# Patient Record
Sex: FEMALE | NOT HISPANIC OR LATINO | URBAN - METROPOLITAN AREA
[De-identification: names, ages, dates, MRNs, and addresses within clinical notes are randomized per-mention and may not be internally consistent; named-entity substitution may affect disease eponyms.]

---

## 2019-03-08 ENCOUNTER — APPOINTMENT (RX ONLY)
Dept: URBAN - METROPOLITAN AREA CLINIC 149 | Facility: CLINIC | Age: 64
Setting detail: DERMATOLOGY
End: 2019-03-08

## 2019-03-08 DIAGNOSIS — L85.3 XEROSIS CUTIS: ICD-10-CM

## 2019-03-08 DIAGNOSIS — L20.89 OTHER ATOPIC DERMATITIS: ICD-10-CM

## 2019-03-08 DIAGNOSIS — Z80.8 FAMILY HISTORY OF MALIGNANT NEOPLASM OF OTHER ORGANS OR SYSTEMS: ICD-10-CM

## 2019-03-08 DIAGNOSIS — L82.1 OTHER SEBORRHEIC KERATOSIS: ICD-10-CM

## 2019-03-08 DIAGNOSIS — D22 MELANOCYTIC NEVI: ICD-10-CM

## 2019-03-08 DIAGNOSIS — L81.8 OTHER SPECIFIED DISORDERS OF PIGMENTATION: ICD-10-CM

## 2019-03-08 PROBLEM — D22.62 MELANOCYTIC NEVI OF LEFT UPPER LIMB, INCLUDING SHOULDER: Status: ACTIVE | Noted: 2019-03-08

## 2019-03-08 PROBLEM — D22.72 MELANOCYTIC NEVI OF LEFT LOWER LIMB, INCLUDING HIP: Status: ACTIVE | Noted: 2019-03-08

## 2019-03-08 PROBLEM — D22.61 MELANOCYTIC NEVI OF RIGHT UPPER LIMB, INCLUDING SHOULDER: Status: ACTIVE | Noted: 2019-03-08

## 2019-03-08 PROBLEM — E78.5 HYPERLIPIDEMIA, UNSPECIFIED: Status: ACTIVE | Noted: 2019-03-08

## 2019-03-08 PROBLEM — Z85.3 PERSONAL HISTORY OF MALIGNANT NEOPLASM OF BREAST: Status: ACTIVE | Noted: 2019-03-08

## 2019-03-08 PROBLEM — D22.5 MELANOCYTIC NEVI OF TRUNK: Status: ACTIVE | Noted: 2019-03-08

## 2019-03-08 PROBLEM — D22.71 MELANOCYTIC NEVI OF RIGHT LOWER LIMB, INCLUDING HIP: Status: ACTIVE | Noted: 2019-03-08

## 2019-03-08 PROBLEM — K21.9 GASTRO-ESOPHAGEAL REFLUX DISEASE WITHOUT ESOPHAGITIS: Status: ACTIVE | Noted: 2019-03-08

## 2019-03-08 PROCEDURE — 99203 OFFICE O/P NEW LOW 30 MIN: CPT

## 2019-03-08 PROCEDURE — ? COUNSELING

## 2019-03-08 PROCEDURE — ? PRESCRIPTION

## 2019-03-08 RX ORDER — PIMECROLIMUS 10 MG/G
CREAM TOPICAL
Qty: 60 | Refills: 2 | Status: ERX | COMMUNITY
Start: 2019-03-08

## 2019-03-08 RX ADMIN — PIMECROLIMUS: 10 CREAM TOPICAL at 00:00

## 2019-03-08 ASSESSMENT — LOCATION DETAILED DESCRIPTION DERM
LOCATION DETAILED: RIGHT PROXIMAL POSTERIOR UPPER ARM
LOCATION DETAILED: LEFT INFERIOR CENTRAL MALAR CHEEK
LOCATION DETAILED: SUBXIPHOID
LOCATION DETAILED: LEFT PROXIMAL POSTERIOR UPPER ARM
LOCATION DETAILED: MIDDLE STERNUM
LOCATION DETAILED: SUPERIOR THORACIC SPINE
LOCATION DETAILED: LEFT MEDIAL SUPERIOR CHEST
LOCATION DETAILED: RIGHT ANTERIOR DISTAL THIGH
LOCATION DETAILED: RIGHT ANTERIOR PROXIMAL THIGH
LOCATION DETAILED: RIGHT INFERIOR CENTRAL MALAR CHEEK
LOCATION DETAILED: LEFT ANTERIOR DISTAL THIGH
LOCATION DETAILED: INFERIOR MID FOREHEAD
LOCATION DETAILED: LOWER STERNUM

## 2019-03-08 ASSESSMENT — LOCATION SIMPLE DESCRIPTION DERM
LOCATION SIMPLE: UPPER BACK
LOCATION SIMPLE: CHEST
LOCATION SIMPLE: LEFT THIGH
LOCATION SIMPLE: ABDOMEN
LOCATION SIMPLE: RIGHT THIGH
LOCATION SIMPLE: RIGHT POSTERIOR UPPER ARM
LOCATION SIMPLE: LEFT POSTERIOR UPPER ARM
LOCATION SIMPLE: INFERIOR FOREHEAD
LOCATION SIMPLE: RIGHT CHEEK
LOCATION SIMPLE: LEFT CHEEK

## 2019-03-08 ASSESSMENT — LOCATION ZONE DERM
LOCATION ZONE: ARM
LOCATION ZONE: TRUNK
LOCATION ZONE: FACE
LOCATION ZONE: LEG

## 2019-03-12 ENCOUNTER — APPOINTMENT (RX ONLY)
Dept: URBAN - METROPOLITAN AREA CLINIC 149 | Facility: CLINIC | Age: 64
Setting detail: DERMATOLOGY
End: 2019-03-12

## 2019-03-12 DIAGNOSIS — L20.89 OTHER ATOPIC DERMATITIS: ICD-10-CM

## 2019-03-12 DIAGNOSIS — L259 CONTACT DERMATITIS AND OTHER ECZEMA, UNSPECIFIED CAUSE: ICD-10-CM

## 2019-03-12 PROBLEM — L23.3 ALLERGIC CONTACT DERMATITIS DUE TO DRUGS IN CONTACT WITH SKIN: Status: ACTIVE | Noted: 2019-03-12

## 2019-03-12 PROCEDURE — 99213 OFFICE O/P EST LOW 20 MIN: CPT

## 2019-03-12 PROCEDURE — ? COUNSELING

## 2019-03-12 PROCEDURE — ? PRESCRIPTION

## 2019-03-12 RX ORDER — DESONIDE 0.5 MG/G
OINTMENT TOPICAL
Qty: 1 | Refills: 1 | Status: ERX | COMMUNITY
Start: 2019-03-12

## 2019-03-12 RX ADMIN — DESONIDE: 0.5 OINTMENT TOPICAL at 00:00

## 2019-03-12 ASSESSMENT — LOCATION DETAILED DESCRIPTION DERM
LOCATION DETAILED: LEFT CENTRAL MALAR CHEEK
LOCATION DETAILED: RIGHT INFERIOR CENTRAL MALAR CHEEK
LOCATION DETAILED: RIGHT CENTRAL MALAR CHEEK
LOCATION DETAILED: LEFT INFERIOR CENTRAL MALAR CHEEK

## 2019-03-12 ASSESSMENT — LOCATION ZONE DERM: LOCATION ZONE: FACE

## 2019-03-12 ASSESSMENT — LOCATION SIMPLE DESCRIPTION DERM
LOCATION SIMPLE: LEFT CHEEK
LOCATION SIMPLE: RIGHT CHEEK

## 2020-03-06 ENCOUNTER — APPOINTMENT (RX ONLY)
Dept: URBAN - METROPOLITAN AREA CLINIC 149 | Facility: CLINIC | Age: 65
Setting detail: DERMATOLOGY
End: 2020-03-06

## 2020-03-06 DIAGNOSIS — L20.89 OTHER ATOPIC DERMATITIS: ICD-10-CM

## 2020-03-06 DIAGNOSIS — D22 MELANOCYTIC NEVI: ICD-10-CM

## 2020-03-06 DIAGNOSIS — Z80.8 FAMILY HISTORY OF MALIGNANT NEOPLASM OF OTHER ORGANS OR SYSTEMS: ICD-10-CM

## 2020-03-06 DIAGNOSIS — L57.0 ACTINIC KERATOSIS: ICD-10-CM

## 2020-03-06 DIAGNOSIS — L90.5 SCAR CONDITIONS AND FIBROSIS OF SKIN: ICD-10-CM

## 2020-03-06 DIAGNOSIS — L30.9 DERMATITIS, UNSPECIFIED: ICD-10-CM

## 2020-03-06 DIAGNOSIS — L81.8 OTHER SPECIFIED DISORDERS OF PIGMENTATION: ICD-10-CM

## 2020-03-06 DIAGNOSIS — L73.8 OTHER SPECIFIED FOLLICULAR DISORDERS: ICD-10-CM

## 2020-03-06 PROBLEM — F41.9 ANXIETY DISORDER, UNSPECIFIED: Status: ACTIVE | Noted: 2020-03-06

## 2020-03-06 PROBLEM — D22.72 MELANOCYTIC NEVI OF LEFT LOWER LIMB, INCLUDING HIP: Status: ACTIVE | Noted: 2020-03-06

## 2020-03-06 PROBLEM — D22.62 MELANOCYTIC NEVI OF LEFT UPPER LIMB, INCLUDING SHOULDER: Status: ACTIVE | Noted: 2020-03-06

## 2020-03-06 PROBLEM — D22.4 MELANOCYTIC NEVI OF SCALP AND NECK: Status: ACTIVE | Noted: 2020-03-06

## 2020-03-06 PROBLEM — D22.39 MELANOCYTIC NEVI OF OTHER PARTS OF FACE: Status: ACTIVE | Noted: 2020-03-06

## 2020-03-06 PROBLEM — F32.9 MAJOR DEPRESSIVE DISORDER, SINGLE EPISODE, UNSPECIFIED: Status: ACTIVE | Noted: 2020-03-06

## 2020-03-06 PROBLEM — L20.84 INTRINSIC (ALLERGIC) ECZEMA: Status: ACTIVE | Noted: 2020-03-06

## 2020-03-06 PROBLEM — D22.5 MELANOCYTIC NEVI OF TRUNK: Status: ACTIVE | Noted: 2020-03-06

## 2020-03-06 PROBLEM — D22.71 MELANOCYTIC NEVI OF RIGHT LOWER LIMB, INCLUDING HIP: Status: ACTIVE | Noted: 2020-03-06

## 2020-03-06 PROBLEM — D22.61 MELANOCYTIC NEVI OF RIGHT UPPER LIMB, INCLUDING SHOULDER: Status: ACTIVE | Noted: 2020-03-06

## 2020-03-06 PROCEDURE — ? PRESCRIPTION

## 2020-03-06 PROCEDURE — ? ADDITIONAL NOTES

## 2020-03-06 PROCEDURE — ? COUNSELING

## 2020-03-06 PROCEDURE — 17000 DESTRUCT PREMALG LESION: CPT

## 2020-03-06 PROCEDURE — ? LIQUID NITROGEN

## 2020-03-06 PROCEDURE — 99214 OFFICE O/P EST MOD 30 MIN: CPT | Mod: 25

## 2020-03-06 PROCEDURE — ? NOTED ON EXAM BUT NOT TREATED

## 2020-03-06 RX ORDER — DESONIDE 0.5 MG/G
OINTMENT TOPICAL
Qty: 1 | Refills: 10 | Status: ERX

## 2020-03-06 ASSESSMENT — LOCATION SIMPLE DESCRIPTION DERM
LOCATION SIMPLE: RIGHT CHEEK
LOCATION SIMPLE: RIGHT FOREARM
LOCATION SIMPLE: LEFT SHOULDER
LOCATION SIMPLE: RIGHT UPPER ARM
LOCATION SIMPLE: RIGHT THIGH
LOCATION SIMPLE: RIGHT SHOULDER
LOCATION SIMPLE: CHEST
LOCATION SIMPLE: LEFT NOSE
LOCATION SIMPLE: LEFT UPPER BACK
LOCATION SIMPLE: RIGHT ANTERIOR NECK
LOCATION SIMPLE: LEFT FOREARM
LOCATION SIMPLE: LEFT LOWER BACK
LOCATION SIMPLE: LEFT CHEEK
LOCATION SIMPLE: RIGHT LOWER BACK
LOCATION SIMPLE: LEFT UPPER ARM
LOCATION SIMPLE: RIGHT UPPER BACK
LOCATION SIMPLE: LEFT PRETIBIAL REGION
LOCATION SIMPLE: ABDOMEN
LOCATION SIMPLE: RIGHT KNEE
LOCATION SIMPLE: RIGHT PRETIBIAL REGION
LOCATION SIMPLE: LEFT POSTERIOR UPPER ARM
LOCATION SIMPLE: LEFT THIGH

## 2020-03-06 ASSESSMENT — LOCATION DETAILED DESCRIPTION DERM
LOCATION DETAILED: LEFT ANTERIOR PROXIMAL THIGH
LOCATION DETAILED: MIDDLE STERNUM
LOCATION DETAILED: RIGHT SUPERIOR LATERAL LOWER BACK
LOCATION DETAILED: LEFT PROXIMAL PRETIBIAL REGION
LOCATION DETAILED: LEFT SUPERIOR UPPER BACK
LOCATION DETAILED: LEFT PROXIMAL ULNAR DORSAL FOREARM
LOCATION DETAILED: LEFT MEDIAL SUPERIOR CHEST
LOCATION DETAILED: RIGHT PROXIMAL DORSAL FOREARM
LOCATION DETAILED: RIGHT INFERIOR CENTRAL MALAR CHEEK
LOCATION DETAILED: LEFT ANTERIOR SHOULDER
LOCATION DETAILED: RIGHT ANTERIOR SHOULDER
LOCATION DETAILED: RIGHT SUPERIOR MEDIAL UPPER BACK
LOCATION DETAILED: LEFT SUPERIOR MEDIAL UPPER BACK
LOCATION DETAILED: RIGHT KNEE
LOCATION DETAILED: SUBXIPHOID
LOCATION DETAILED: LEFT ANTERIOR DISTAL THIGH
LOCATION DETAILED: LEFT SUPERIOR LATERAL MIDBACK
LOCATION DETAILED: LEFT PROXIMAL DORSAL FOREARM
LOCATION DETAILED: RIGHT PROXIMAL PRETIBIAL REGION
LOCATION DETAILED: RIGHT INFERIOR UPPER BACK
LOCATION DETAILED: LEFT ANTERIOR DISTAL UPPER ARM
LOCATION DETAILED: LEFT PROXIMAL POSTERIOR UPPER ARM
LOCATION DETAILED: LEFT NASAL SIDEWALL
LOCATION DETAILED: RIGHT SUPERIOR LATERAL NECK
LOCATION DETAILED: RIGHT CENTRAL MALAR CHEEK
LOCATION DETAILED: RIGHT POSTERIOR SHOULDER
LOCATION DETAILED: RIGHT ANTERIOR PROXIMAL UPPER ARM
LOCATION DETAILED: LEFT CENTRAL MALAR CHEEK
LOCATION DETAILED: RIGHT LATERAL BUCCAL CHEEK
LOCATION DETAILED: RIGHT ANTERIOR DISTAL THIGH
LOCATION DETAILED: LEFT MEDIAL UPPER BACK

## 2020-03-06 ASSESSMENT — LOCATION ZONE DERM
LOCATION ZONE: LEG
LOCATION ZONE: FACE
LOCATION ZONE: NOSE
LOCATION ZONE: NECK
LOCATION ZONE: TRUNK
LOCATION ZONE: ARM

## 2020-03-06 NOTE — PROCEDURE: LIQUID NITROGEN
Consent: The patient's consent was obtained including but not limited to risks of crusting, scabbing, blistering, scarring, darker or lighter pigmentary change, recurrence, incomplete removal and infection.
Number Of Freeze-Thaw Cycles: 2 freeze-thaw cycles
Detail Level: Zone
Post-Care Instructions: I reviewed with the patient in detail post-care instructions. Patient is to wear sunprotection, and avoid picking at any of the treated lesions. Pt may apply Vaseline to crusted or scabbing areas.
Render Note In Bullet Format When Appropriate: No
Duration Of Freeze Thaw-Cycle (Seconds): 3

## 2020-03-06 NOTE — PROCEDURE: ADDITIONAL NOTES
Additional Notes: Use half betadine and half water, on the affected area in the shower, leave on for 5 minutes then rinse.
Detail Level: Simple

## 2020-08-10 ENCOUNTER — APPOINTMENT (RX ONLY)
Dept: URBAN - METROPOLITAN AREA CLINIC 4 | Facility: CLINIC | Age: 65
Setting detail: DERMATOLOGY
End: 2020-08-10

## 2020-08-10 DIAGNOSIS — L73.8 OTHER SPECIFIED FOLLICULAR DISORDERS: ICD-10-CM

## 2020-08-10 DIAGNOSIS — L81.8 OTHER SPECIFIED DISORDERS OF PIGMENTATION: ICD-10-CM

## 2020-08-10 DIAGNOSIS — L82.1 OTHER SEBORRHEIC KERATOSIS: ICD-10-CM

## 2020-08-10 DIAGNOSIS — L81.4 OTHER MELANIN HYPERPIGMENTATION: ICD-10-CM

## 2020-08-10 DIAGNOSIS — Z71.89 OTHER SPECIFIED COUNSELING: ICD-10-CM

## 2020-08-10 DIAGNOSIS — L57.0 ACTINIC KERATOSIS: ICD-10-CM

## 2020-08-10 DIAGNOSIS — D22 MELANOCYTIC NEVI: ICD-10-CM

## 2020-08-10 DIAGNOSIS — L72.0 EPIDERMAL CYST: ICD-10-CM

## 2020-08-10 DIAGNOSIS — D18.0 HEMANGIOMA: ICD-10-CM

## 2020-08-10 PROBLEM — D18.01 HEMANGIOMA OF SKIN AND SUBCUTANEOUS TISSUE: Status: ACTIVE | Noted: 2020-08-10

## 2020-08-10 PROBLEM — D23.72 OTHER BENIGN NEOPLASM OF SKIN OF LEFT LOWER LIMB, INCLUDING HIP: Status: ACTIVE | Noted: 2020-08-10

## 2020-08-10 PROBLEM — D22.72 MELANOCYTIC NEVI OF LEFT LOWER LIMB, INCLUDING HIP: Status: ACTIVE | Noted: 2020-08-10

## 2020-08-10 PROBLEM — D22.5 MELANOCYTIC NEVI OF TRUNK: Status: ACTIVE | Noted: 2020-08-10

## 2020-08-10 PROBLEM — D22.71 MELANOCYTIC NEVI OF RIGHT LOWER LIMB, INCLUDING HIP: Status: ACTIVE | Noted: 2020-08-10

## 2020-08-10 PROBLEM — D23.39 OTHER BENIGN NEOPLASM OF SKIN OF OTHER PARTS OF FACE: Status: ACTIVE | Noted: 2020-08-10

## 2020-08-10 PROBLEM — D22.61 MELANOCYTIC NEVI OF RIGHT UPPER LIMB, INCLUDING SHOULDER: Status: ACTIVE | Noted: 2020-08-10

## 2020-08-10 PROBLEM — D22.62 MELANOCYTIC NEVI OF LEFT UPPER LIMB, INCLUDING SHOULDER: Status: ACTIVE | Noted: 2020-08-10

## 2020-08-10 PROCEDURE — ? ADDITIONAL NOTES

## 2020-08-10 PROCEDURE — 17000 DESTRUCT PREMALG LESION: CPT

## 2020-08-10 PROCEDURE — 99203 OFFICE O/P NEW LOW 30 MIN: CPT | Mod: 25

## 2020-08-10 PROCEDURE — ? COUNSELING

## 2020-08-10 PROCEDURE — 17003 DESTRUCT PREMALG LES 2-14: CPT

## 2020-08-10 PROCEDURE — ? LIQUID NITROGEN

## 2020-08-10 ASSESSMENT — LOCATION DETAILED DESCRIPTION DERM
LOCATION DETAILED: RIGHT CENTRAL LATERAL NECK
LOCATION DETAILED: LEFT ANTERIOR DISTAL UPPER ARM
LOCATION DETAILED: RIGHT ANTERIOR PROXIMAL THIGH
LOCATION DETAILED: RIGHT ANTERIOR PROXIMAL UPPER ARM
LOCATION DETAILED: SUPERIOR THORACIC SPINE
LOCATION DETAILED: NASAL SUPRATIP
LOCATION DETAILED: LEFT LOWER CUTANEOUS LIP
LOCATION DETAILED: STERNAL NOTCH
LOCATION DETAILED: INFERIOR THORACIC SPINE
LOCATION DETAILED: RIGHT ANTERIOR DISTAL THIGH
LOCATION DETAILED: LEFT INFERIOR MEDIAL FOREHEAD
LOCATION DETAILED: LEFT SUPERIOR MEDIAL UPPER BACK
LOCATION DETAILED: SUBXIPHOID
LOCATION DETAILED: LEFT MEDIAL FOREHEAD
LOCATION DETAILED: LEFT ANTERIOR PROXIMAL THIGH
LOCATION DETAILED: EPIGASTRIC SKIN
LOCATION DETAILED: LOWER STERNUM
LOCATION DETAILED: LEFT ANTERIOR PROXIMAL UPPER ARM
LOCATION DETAILED: SUPERIOR MID FOREHEAD
LOCATION DETAILED: MIDDLE STERNUM
LOCATION DETAILED: LEFT ANTERIOR DISTAL THIGH
LOCATION DETAILED: LEFT MEDIAL UPPER BACK
LOCATION DETAILED: LEFT UPPER CUTANEOUS LIP
LOCATION DETAILED: RIGHT ANTERIOR DISTAL UPPER ARM

## 2020-08-10 ASSESSMENT — LOCATION SIMPLE DESCRIPTION DERM
LOCATION SIMPLE: SUPERIOR FOREHEAD
LOCATION SIMPLE: UPPER BACK
LOCATION SIMPLE: CHEST
LOCATION SIMPLE: NOSE
LOCATION SIMPLE: LEFT FOREHEAD
LOCATION SIMPLE: RIGHT THIGH
LOCATION SIMPLE: LEFT UPPER ARM
LOCATION SIMPLE: RIGHT UPPER ARM
LOCATION SIMPLE: ABDOMEN
LOCATION SIMPLE: LEFT LIP
LOCATION SIMPLE: NECK
LOCATION SIMPLE: LEFT UPPER BACK
LOCATION SIMPLE: LEFT THIGH

## 2020-08-10 ASSESSMENT — LOCATION ZONE DERM
LOCATION ZONE: NECK
LOCATION ZONE: LEG
LOCATION ZONE: LIP
LOCATION ZONE: ARM
LOCATION ZONE: FACE
LOCATION ZONE: NOSE
LOCATION ZONE: TRUNK

## 2020-08-10 NOTE — HPI: EVALUATION OF SKIN LESION(S)
What Type Of Note Output Would You Prefer (Optional)?: Standard Output
How Severe Are Your Spot(S)?: moderate
Have Your Spot(S) Been Treated In The Past?: has not been treated
Hpi Title: Evaluation of Skin Lesions
Additional History: Patient is in for a FBE.
Family Member: Step brother

## 2021-01-19 ENCOUNTER — HOSPITAL ENCOUNTER (EMERGENCY)
Dept: HOSPITAL 8 - ED | Age: 66
Discharge: HOME | End: 2021-01-19
Payer: MEDICARE

## 2021-01-19 VITALS — HEIGHT: 66 IN | WEIGHT: 182.1 LBS | BODY MASS INDEX: 29.27 KG/M2

## 2021-01-19 VITALS — DIASTOLIC BLOOD PRESSURE: 75 MMHG | SYSTOLIC BLOOD PRESSURE: 133 MMHG

## 2021-01-19 DIAGNOSIS — Z90.13: ICD-10-CM

## 2021-01-19 DIAGNOSIS — I10: ICD-10-CM

## 2021-01-19 DIAGNOSIS — R00.2: Primary | ICD-10-CM

## 2021-01-19 DIAGNOSIS — E03.9: ICD-10-CM

## 2021-01-19 DIAGNOSIS — Z85.3: ICD-10-CM

## 2021-01-19 DIAGNOSIS — E78.00: ICD-10-CM

## 2021-01-19 LAB
ALBUMIN SERPL-MCNC: 3.7 G/DL (ref 3.4–5)
ANION GAP SERPL CALC-SCNC: 8 MMOL/L (ref 5–15)
BASOPHILS # BLD AUTO: 0 X10^3/UL (ref 0–0.1)
BASOPHILS NFR BLD AUTO: 1 % (ref 0–1)
CALCIUM SERPL-MCNC: 8.7 MG/DL (ref 8.5–10.1)
CHLORIDE SERPL-SCNC: 108 MMOL/L (ref 98–107)
CREAT SERPL-MCNC: 1.09 MG/DL (ref 0.55–1.02)
EOSINOPHIL # BLD AUTO: 0.1 X10^3/UL (ref 0–0.4)
EOSINOPHIL NFR BLD AUTO: 2 % (ref 1–7)
ERYTHROCYTE [DISTWIDTH] IN BLOOD BY AUTOMATED COUNT: 12.9 % (ref 9.6–15.2)
LYMPHOCYTES # BLD AUTO: 2.6 X10^3/UL (ref 1–3.4)
LYMPHOCYTES NFR BLD AUTO: 41 % (ref 22–44)
MCH RBC QN AUTO: 33 PG (ref 27–34.8)
MCHC RBC AUTO-ENTMCNC: 33.7 G/DL (ref 32.4–35.8)
MD: NO
MONOCYTES # BLD AUTO: 0.5 X10^3/UL (ref 0.2–0.8)
MONOCYTES NFR BLD AUTO: 8 % (ref 2–9)
NEUTROPHILS # BLD AUTO: 3.1 X10^3/UL (ref 1.8–6.8)
NEUTROPHILS NFR BLD AUTO: 49 % (ref 42–75)
PLATELET # BLD AUTO: 230 X10^3/UL (ref 130–400)
PMV BLD AUTO: 8.3 FL (ref 7.4–10.4)
RBC # BLD AUTO: 3.82 X10^6/UL (ref 3.82–5.3)

## 2021-01-19 PROCEDURE — 99284 EMERGENCY DEPT VISIT MOD MDM: CPT

## 2021-01-19 PROCEDURE — 36415 COLL VENOUS BLD VENIPUNCTURE: CPT

## 2021-01-19 PROCEDURE — 84443 ASSAY THYROID STIM HORMONE: CPT

## 2021-01-19 PROCEDURE — 83735 ASSAY OF MAGNESIUM: CPT

## 2021-01-19 PROCEDURE — 93005 ELECTROCARDIOGRAM TRACING: CPT

## 2021-01-19 PROCEDURE — 82040 ASSAY OF SERUM ALBUMIN: CPT

## 2021-01-19 PROCEDURE — 80048 BASIC METABOLIC PNL TOTAL CA: CPT

## 2021-01-19 PROCEDURE — 85025 COMPLETE CBC W/AUTO DIFF WBC: CPT

## 2021-01-19 NOTE — NUR
PT COMES IN C/O PALPITATIONS X1 MO. PT STATES APPROX. 1.5 MONTHS AGO, SHE 
CHANGED FROM XANAX TO VALLIUM. PT STATES HX OF ANXIETY, HTN, HYPOTHYROID, LEFT 
BREAST CANCER WITH BILATERAL MASTECTOMY AND HIATAL HERNIA. MONITORS CONNECTED. 
EKG COMPLETE. VSS. NAD. CALL LIGHT W/IN REACH.

## 2021-01-19 NOTE — NUR
PT RESTING ON GURNEY. DENIES PAIN AT THIS TIME. VSS. NAD. AWAITING LABS. CALL 
LIGHT W/IN REACH. WILL CONTINUE TO MONITOR

## 2021-01-19 NOTE — NUR
PT AMBULATED TO DISCHARGE WITH STEADY GAIT. PT ENCOURAGED TO FOLLOWUP AS 
DISCUSSED. PT EDUCATED TO RETURN TO THE ED WITH WORSENING SYMPTOMS.

## 2021-02-25 ENCOUNTER — HOSPITAL ENCOUNTER (OUTPATIENT)
Dept: HOSPITAL 8 - CFH | Age: 66
Discharge: HOME | End: 2021-02-25
Attending: INTERNAL MEDICINE
Payer: MEDICARE

## 2021-02-25 DIAGNOSIS — R06.02: ICD-10-CM

## 2021-02-25 DIAGNOSIS — I34.0: Primary | ICD-10-CM

## 2021-02-25 DIAGNOSIS — R42: ICD-10-CM

## 2021-02-25 PROCEDURE — 93017 CV STRESS TEST TRACING ONLY: CPT

## 2021-02-25 PROCEDURE — 93306 TTE W/DOPPLER COMPLETE: CPT

## 2021-02-25 PROCEDURE — 78452 HT MUSCLE IMAGE SPECT MULT: CPT

## 2021-02-25 PROCEDURE — A9502 TC99M TETROFOSMIN: HCPCS

## 2021-03-03 DIAGNOSIS — Z23 NEED FOR VACCINATION: ICD-10-CM

## 2021-03-18 ENCOUNTER — HOSPITAL ENCOUNTER (OUTPATIENT)
Dept: HOSPITAL 8 - CFH | Age: 66
Discharge: HOME | End: 2021-03-18
Attending: INTERNAL MEDICINE
Payer: MEDICARE

## 2021-03-18 DIAGNOSIS — C50.512: Primary | ICD-10-CM

## 2021-03-18 PROCEDURE — 76642 ULTRASOUND BREAST LIMITED: CPT

## 2021-03-31 ENCOUNTER — IMMUNIZATION (OUTPATIENT)
Dept: FAMILY PLANNING/WOMEN'S HEALTH CLINIC | Facility: IMMUNIZATION CENTER | Age: 66
End: 2021-03-31
Attending: INTERNAL MEDICINE
Payer: MEDICARE

## 2021-03-31 DIAGNOSIS — Z23 NEED FOR VACCINATION: ICD-10-CM

## 2021-03-31 DIAGNOSIS — Z23 ENCOUNTER FOR VACCINATION: Primary | ICD-10-CM

## 2021-03-31 PROCEDURE — 91300 PFIZER SARS-COV-2 VACCINE: CPT | Performed by: INTERNAL MEDICINE

## 2021-03-31 PROCEDURE — 0001A PFIZER SARS-COV-2 VACCINE: CPT | Performed by: INTERNAL MEDICINE

## 2021-04-22 ENCOUNTER — IMMUNIZATION (OUTPATIENT)
Dept: FAMILY PLANNING/WOMEN'S HEALTH CLINIC | Facility: IMMUNIZATION CENTER | Age: 66
End: 2021-04-22
Payer: MEDICARE

## 2021-04-22 DIAGNOSIS — Z23 ENCOUNTER FOR VACCINATION: Primary | ICD-10-CM

## 2021-04-22 PROCEDURE — 91300 PFIZER SARS-COV-2 VACCINE: CPT

## 2021-04-22 PROCEDURE — 0002A PFIZER SARS-COV-2 VACCINE: CPT

## 2021-07-12 ENCOUNTER — HOSPITAL ENCOUNTER (EMERGENCY)
Dept: HOSPITAL 8 - ED | Age: 66
Discharge: HOME | End: 2021-07-12
Payer: MEDICARE

## 2021-07-12 VITALS — WEIGHT: 179.02 LBS | BODY MASS INDEX: 28.77 KG/M2 | HEIGHT: 66 IN

## 2021-07-12 VITALS — SYSTOLIC BLOOD PRESSURE: 148 MMHG | DIASTOLIC BLOOD PRESSURE: 83 MMHG

## 2021-07-12 DIAGNOSIS — Z85.3: ICD-10-CM

## 2021-07-12 DIAGNOSIS — E03.9: ICD-10-CM

## 2021-07-12 DIAGNOSIS — E78.00: ICD-10-CM

## 2021-07-12 DIAGNOSIS — M79.622: Primary | ICD-10-CM

## 2021-07-12 DIAGNOSIS — M25.522: ICD-10-CM

## 2021-07-12 DIAGNOSIS — I10: ICD-10-CM

## 2021-07-12 LAB
ANION GAP SERPL CALC-SCNC: 10 MMOL/L (ref 5–15)
BASOPHILS # BLD AUTO: 0.1 X10^3/UL (ref 0–0.1)
BASOPHILS NFR BLD AUTO: 1 % (ref 0–1)
CALCIUM SERPL-MCNC: 9.5 MG/DL (ref 8.5–10.1)
CHLORIDE SERPL-SCNC: 104 MMOL/L (ref 98–107)
CREAT SERPL-MCNC: 1.13 MG/DL (ref 0.55–1.02)
EOSINOPHIL # BLD AUTO: 0.1 X10^3/UL (ref 0–0.4)
EOSINOPHIL NFR BLD AUTO: 1 % (ref 1–7)
ERYTHROCYTE [DISTWIDTH] IN BLOOD BY AUTOMATED COUNT: 13.2 % (ref 9.6–15.2)
LYMPHOCYTES # BLD AUTO: 2.9 X10^3/UL (ref 1–3.4)
LYMPHOCYTES NFR BLD AUTO: 35 % (ref 22–44)
MCH RBC QN AUTO: 33 PG (ref 27–34.8)
MCHC RBC AUTO-ENTMCNC: 33.5 G/DL (ref 32.4–35.8)
MONOCYTES # BLD AUTO: 0.4 X10^3/UL (ref 0.2–0.8)
MONOCYTES NFR BLD AUTO: 4 % (ref 2–9)
NEUTROPHILS # BLD AUTO: 4.9 X10^3/UL (ref 1.8–6.8)
NEUTROPHILS NFR BLD AUTO: 59 % (ref 42–75)
PLATELET # BLD AUTO: 283 X10^3/UL (ref 130–400)
PMV BLD AUTO: 8.4 FL (ref 7.4–10.4)
RBC # BLD AUTO: 4.18 X10^6/UL (ref 3.82–5.3)

## 2021-07-12 PROCEDURE — 99284 EMERGENCY DEPT VISIT MOD MDM: CPT

## 2021-07-12 PROCEDURE — 85025 COMPLETE CBC W/AUTO DIFF WBC: CPT

## 2021-07-12 PROCEDURE — 36415 COLL VENOUS BLD VENIPUNCTURE: CPT

## 2021-07-12 PROCEDURE — 80048 BASIC METABOLIC PNL TOTAL CA: CPT

## 2021-07-12 NOTE — NUR
First contact with patient, given call bell and discussed POC/AIDET, waiting 
for ERP re-eval/dispo.

## 2021-10-18 PROBLEM — F10.10 ALCOHOL CONSUMPTION BINGE DRINKING: Status: ACTIVE | Noted: 2020-07-07

## 2021-10-18 PROBLEM — F43.10 POSTTRAUMATIC STRESS DISORDER: Status: ACTIVE | Noted: 2020-07-07

## 2021-10-18 PROBLEM — F41.1 GENERALIZED ANXIETY DISORDER: Status: ACTIVE | Noted: 2021-10-18

## 2021-10-18 PROBLEM — F33.2 SEVERE EPISODE OF RECURRENT MAJOR DEPRESSIVE DISORDER (HCC): Status: ACTIVE | Noted: 2020-07-07

## 2022-10-12 ENCOUNTER — APPOINTMENT (RX ONLY)
Dept: URBAN - METROPOLITAN AREA CLINIC 4 | Facility: CLINIC | Age: 67
Setting detail: DERMATOLOGY
End: 2022-10-12

## 2022-10-12 DIAGNOSIS — D22 MELANOCYTIC NEVI: ICD-10-CM

## 2022-10-12 DIAGNOSIS — L82.1 OTHER SEBORRHEIC KERATOSIS: ICD-10-CM

## 2022-10-12 DIAGNOSIS — Z71.89 OTHER SPECIFIED COUNSELING: ICD-10-CM

## 2022-10-12 DIAGNOSIS — L81.4 OTHER MELANIN HYPERPIGMENTATION: ICD-10-CM

## 2022-10-12 DIAGNOSIS — L70.0 ACNE VULGARIS: ICD-10-CM

## 2022-10-12 DIAGNOSIS — L81.8 OTHER SPECIFIED DISORDERS OF PIGMENTATION: ICD-10-CM

## 2022-10-12 DIAGNOSIS — L68.0 HIRSUTISM: ICD-10-CM

## 2022-10-12 DIAGNOSIS — L30.4 ERYTHEMA INTERTRIGO: ICD-10-CM

## 2022-10-12 DIAGNOSIS — L30.9 DERMATITIS, UNSPECIFIED: ICD-10-CM

## 2022-10-12 DIAGNOSIS — D18.0 HEMANGIOMA: ICD-10-CM

## 2022-10-12 PROBLEM — D18.01 HEMANGIOMA OF SKIN AND SUBCUTANEOUS TISSUE: Status: ACTIVE | Noted: 2022-10-12

## 2022-10-12 PROBLEM — D22.5 MELANOCYTIC NEVI OF TRUNK: Status: ACTIVE | Noted: 2022-10-12

## 2022-10-12 PROCEDURE — ? ADDITIONAL NOTES

## 2022-10-12 PROCEDURE — ? COUNSELING

## 2022-10-12 PROCEDURE — ? PRESCRIPTION

## 2022-10-12 PROCEDURE — 99213 OFFICE O/P EST LOW 20 MIN: CPT

## 2022-10-12 RX ORDER — HYDROCORTISONE 25 MG/G
CREAM TOPICAL BID
Qty: 30 | Refills: 0 | Status: ERX | COMMUNITY
Start: 2022-10-12

## 2022-10-12 RX ADMIN — HYDROCORTISONE: 25 CREAM TOPICAL at 00:00

## 2022-10-12 ASSESSMENT — LOCATION DETAILED DESCRIPTION DERM
LOCATION DETAILED: LEFT SUPERIOR MEDIAL UPPER BACK
LOCATION DETAILED: RIGHT SUPERIOR MEDIAL UPPER BACK
LOCATION DETAILED: LEFT LATERAL SUPERIOR CHEST
LOCATION DETAILED: EPIGASTRIC SKIN
LOCATION DETAILED: RIGHT SUPERIOR MEDIAL BUCCAL CHEEK
LOCATION DETAILED: LEFT MEDIAL SUPERIOR CHEST
LOCATION DETAILED: RIGHT INFERIOR MEDIAL UPPER BACK
LOCATION DETAILED: RIGHT MEDIAL UPPER BACK
LOCATION DETAILED: UPPER STERNUM

## 2022-10-12 ASSESSMENT — LOCATION SIMPLE DESCRIPTION DERM
LOCATION SIMPLE: RIGHT CHEEK
LOCATION SIMPLE: ABDOMEN
LOCATION SIMPLE: RIGHT UPPER BACK
LOCATION SIMPLE: LEFT UPPER BACK
LOCATION SIMPLE: CHEST

## 2022-10-12 ASSESSMENT — LOCATION ZONE DERM
LOCATION ZONE: FACE
LOCATION ZONE: TRUNK

## 2022-10-12 NOTE — PROCEDURE: COUNSELING
Detail Level: Detailed
High Dose Vitamin A Counseling: Side effects reviewed, pt to contact office should one occur.
Spironolactone Pregnancy And Lactation Text: This medication can cause feminization of the male fetus and should be avoided during pregnancy. The active metabolite is also found in breast milk.
Benzoyl Peroxide Counseling: Patient counseled that medicine may cause skin irritation and bleach clothing.  In the event of skin irritation, the patient was advised to reduce the amount of the drug applied or use it less frequently.   The patient verbalized understanding of the proper use and possible adverse effects of benzoyl peroxide.  All of the patient's questions and concerns were addressed.
Topical Clindamycin Pregnancy And Lactation Text: This medication is Pregnancy Category B and is considered safe during pregnancy. It is unknown if it is excreted in breast milk.
Topical Retinoid counseling:  Patient advised to apply a pea-sized amount only at bedtime and wait 30 minutes after washing their face before applying.  If too drying, patient may add a non-comedogenic moisturizer. The patient verbalized understanding of the proper use and possible adverse effects of retinoids.  All of the patient's questions and concerns were addressed.
Topical Sulfur Applications Pregnancy And Lactation Text: This medication is Pregnancy Category C and has an unknown safety profile during pregnancy. It is unknown if this topical medication is excreted in breast milk.
Dapsone Pregnancy And Lactation Text: This medication is Pregnancy Category C and is not considered safe during pregnancy or breast feeding.
Azithromycin Counseling:  I discussed with the patient the risks of azithromycin including but not limited to GI upset, allergic reaction, drug rash, diarrhea, and yeast infections.
Isotretinoin Counseling: Patient should get monthly blood tests, not donate blood, not drive at night if vision affected, not share medication, and not undergo elective surgery for 6 months after tx completed. Side effects reviewed, pt to contact office should one occur.
Birth Control Pills Pregnancy And Lactation Text: This medication should be avoided if pregnant and for the first 30 days post-partum.
Erythromycin Counseling:  I discussed with the patient the risks of erythromycin including but not limited to GI upset, allergic reaction, drug rash, diarrhea, increase in liver enzymes, and yeast infections.
Bactrim Pregnancy And Lactation Text: This medication is Pregnancy Category D and is known to cause fetal risk.  It is also excreted in breast milk.
Minocycline Pregnancy And Lactation Text: This medication is Pregnancy Category D and not consider safe during pregnancy. It is also excreted in breast milk.
Tazorac Pregnancy And Lactation Text: This medication is not safe during pregnancy. It is unknown if this medication is excreted in breast milk.
Azelaic Acid Counseling: Patient counseled that medicine may cause skin irritation and to avoid applying near the eyes.  In the event of skin irritation, the patient was advised to reduce the amount of the drug applied or use it less frequently.   The patient verbalized understanding of the proper use and possible adverse effects of azelaic acid.  All of the patient's questions and concerns were addressed.
Include Pregnancy/Lactation Warning?: No
Azithromycin Pregnancy And Lactation Text: This medication is considered safe during pregnancy and is also secreted in breast milk.
Topical Retinoid Pregnancy And Lactation Text: This medication is Pregnancy Category C. It is unknown if this medication is excreted in breast milk.
Aklief counseling:  Patient advised to apply a pea-sized amount only at bedtime and wait 30 minutes after washing their face before applying.  If too drying, patient may add a non-comedogenic moisturizer.  The most commonly reported side effects including irritation, redness, scaling, dryness, stinging, burning, itching, and increased risk of sunburn.  The patient verbalized understanding of the proper use and possible adverse effects of retinoids.  All of the patient's questions and concerns were addressed.
Winlevi Counseling:  I discussed with the patient the risks of topical clascoterone including but not limited to erythema, scaling, itching, and stinging. Patient voiced their understanding.
Doxycycline Counseling:  Patient counseled regarding possible photosensitivity and increased risk for sunburn.  Patient instructed to avoid sunlight, if possible.  When exposed to sunlight, patients should wear protective clothing, sunglasses, and sunscreen.  The patient was instructed to call the office immediately if the following severe adverse effects occur:  hearing changes, easy bruising/bleeding, severe headache, or vision changes.  The patient verbalized understanding of the proper use and possible adverse effects of doxycycline.  All of the patient's questions and concerns were addressed.
Topical Sulfur Applications Counseling: Topical Sulfur Counseling: Patient counseled that this medication may cause skin irritation or allergic reactions.  In the event of skin irritation, the patient was advised to reduce the amount of the drug applied or use it less frequently.   The patient verbalized understanding of the proper use and possible adverse effects of topical sulfur application.  All of the patient's questions and concerns were addressed.
Dapsone Counseling: I discussed with the patient the risks of dapsone including but not limited to hemolytic anemia, agranulocytosis, rashes, methemoglobinemia, kidney failure, peripheral neuropathy, headaches, GI upset, and liver toxicity.  Patients who start dapsone require monitoring including baseline LFTs and weekly CBCs for the first month, then every month thereafter.  The patient verbalized understanding of the proper use and possible adverse effects of dapsone.  All of the patient's questions and concerns were addressed.
Isotretinoin Pregnancy And Lactation Text: This medication is Pregnancy Category X and is considered extremely dangerous during pregnancy. It is unknown if it is excreted in breast milk.
High Dose Vitamin A Pregnancy And Lactation Text: High dose vitamin A therapy is contraindicated during pregnancy and breast feeding.
Tetracycline Counseling: Patient counseled regarding possible photosensitivity and increased risk for sunburn.  Patient instructed to avoid sunlight, if possible.  When exposed to sunlight, patients should wear protective clothing, sunglasses, and sunscreen.  The patient was instructed to call the office immediately if the following severe adverse effects occur:  hearing changes, easy bruising/bleeding, severe headache, or vision changes.  The patient verbalized understanding of the proper use and possible adverse effects of tetracycline.  All of the patient's questions and concerns were addressed. Patient understands to avoid pregnancy while on therapy due to potential birth defects.
Benzoyl Peroxide Pregnancy And Lactation Text: This medication is Pregnancy Category C. It is unknown if benzoyl peroxide is excreted in breast milk.
Spironolactone Counseling: Patient advised regarding risks of diarrhea, abdominal pain, hyperkalemia, birth defects (for female patients), liver toxicity and renal toxicity. The patient may need blood work to monitor liver and kidney function and potassium levels while on therapy. The patient verbalized understanding of the proper use and possible adverse effects of spironolactone.  All of the patient's questions and concerns were addressed.
Azelaic Acid Pregnancy And Lactation Text: This medication is considered safe during pregnancy and breast feeding.
Topical Clindamycin Counseling: Patient counseled that this medication may cause skin irritation or allergic reactions.  In the event of skin irritation, the patient was advised to reduce the amount of the drug applied or use it less frequently.   The patient verbalized understanding of the proper use and possible adverse effects of clindamycin.  All of the patient's questions and concerns were addressed.
Tazorac Counseling:  Patient advised that medication is irritating and drying.  Patient may need to apply sparingly and wash off after an hour before eventually leaving it on overnight.  The patient verbalized understanding of the proper use and possible adverse effects of tazorac.  All of the patient's questions and concerns were addressed.
Sarecycline Counseling: Patient advised regarding possible photosensitivity and discoloration of the teeth, skin, lips, tongue and gums.  Patient instructed to avoid sunlight, if possible.  When exposed to sunlight, patients should wear protective clothing, sunglasses, and sunscreen.  The patient was instructed to call the office immediately if the following severe adverse effects occur:  hearing changes, easy bruising/bleeding, severe headache, or vision changes.  The patient verbalized understanding of the proper use and possible adverse effects of sarecycline.  All of the patient's questions and concerns were addressed.
Aklief Pregnancy And Lactation Text: It is unknown if this medication is safe to use during pregnancy.  It is unknown if this medication is excreted in breast milk.  Breastfeeding women should use the topical cream on the smallest area of the skin for the shortest time needed while breastfeeding.  Do not apply to nipple and areola.
Winlevi Pregnancy And Lactation Text: This medication is considered safe during pregnancy and breastfeeding.
Bactrim Counseling:  I discussed with the patient the risks of sulfa antibiotics including but not limited to GI upset, allergic reaction, drug rash, diarrhea, dizziness, photosensitivity, and yeast infections.  Rarely, more serious reactions can occur including but not limited to aplastic anemia, agranulocytosis, methemoglobinemia, blood dyscrasias, liver or kidney failure, lung infiltrates or desquamative/blistering drug rashes.
Erythromycin Pregnancy And Lactation Text: This medication is Pregnancy Category B and is considered safe during pregnancy. It is also excreted in breast milk.
Birth Control Pills Counseling: Birth Control Pill Counseling: I discussed with the patient the potential side effects of OCPs including but not limited to increased risk of stroke, heart attack, thrombophlebitis, deep venous thrombosis, hepatic adenomas, breast changes, GI upset, headaches, and depression.  The patient verbalized understanding of the proper use and possible adverse effects of OCPs. All of the patient's questions and concerns were addressed.
Cleanser Recommendations: CeraVe acne foaming face wash on back and chin
Minocycline Counseling: Patient advised regarding possible photosensitivity and discoloration of the teeth, skin, lips, tongue and gums.  Patient instructed to avoid sunlight, if possible.  When exposed to sunlight, patients should wear protective clothing, sunglasses, and sunscreen.  The patient was instructed to call the office immediately if the following severe adverse effects occur:  hearing changes, easy bruising/bleeding, severe headache, or vision changes.  The patient verbalized understanding of the proper use and possible adverse effects of minocycline.  All of the patient's questions and concerns were addressed.
Doxycycline Pregnancy And Lactation Text: This medication is Pregnancy Category D and not consider safe during pregnancy. It is also excreted in breast milk but is considered safe for shorter treatment courses.
Topical Antifungals Recommendations: Zeasorb powder and dry well with blow dryer

## 2022-10-12 NOTE — PROCEDURE: ADDITIONAL NOTES
Additional Notes: Patient is not interested in treatment at this time.
Render Risk Assessment In Note?: no
Detail Level: Detailed
Additional Notes: Recommended patient return if this patch does not resolve for further evaluation and management.

## 2024-06-18 ENCOUNTER — APPOINTMENT (RX ONLY)
Dept: URBAN - METROPOLITAN AREA CLINIC 4 | Facility: CLINIC | Age: 69
Setting detail: DERMATOLOGY
End: 2024-06-18

## 2024-06-18 DIAGNOSIS — L81.4 OTHER MELANIN HYPERPIGMENTATION: ICD-10-CM

## 2024-06-18 DIAGNOSIS — D22 MELANOCYTIC NEVI: ICD-10-CM

## 2024-06-18 DIAGNOSIS — D18.0 HEMANGIOMA: ICD-10-CM

## 2024-06-18 DIAGNOSIS — L81.8 OTHER SPECIFIED DISORDERS OF PIGMENTATION: ICD-10-CM

## 2024-06-18 DIAGNOSIS — Z71.89 OTHER SPECIFIED COUNSELING: ICD-10-CM

## 2024-06-18 DIAGNOSIS — L85.3 XEROSIS CUTIS: ICD-10-CM

## 2024-06-18 DIAGNOSIS — L82.1 OTHER SEBORRHEIC KERATOSIS: ICD-10-CM

## 2024-06-18 DIAGNOSIS — L58.9 RADIODERMATITIS, UNSPECIFIED: ICD-10-CM

## 2024-06-18 PROBLEM — D22.5 MELANOCYTIC NEVI OF TRUNK: Status: ACTIVE | Noted: 2024-06-18

## 2024-06-18 PROBLEM — D48.5 NEOPLASM OF UNCERTAIN BEHAVIOR OF SKIN: Status: ACTIVE | Noted: 2024-06-18

## 2024-06-18 PROBLEM — D18.01 HEMANGIOMA OF SKIN AND SUBCUTANEOUS TISSUE: Status: ACTIVE | Noted: 2024-06-18

## 2024-06-18 PROCEDURE — 11102 TANGNTL BX SKIN SINGLE LES: CPT

## 2024-06-18 PROCEDURE — ? COUNSELING

## 2024-06-18 PROCEDURE — 99213 OFFICE O/P EST LOW 20 MIN: CPT | Mod: 25

## 2024-06-18 PROCEDURE — 11103 TANGNTL BX SKIN EA SEP/ADDL: CPT

## 2024-06-18 PROCEDURE — ? BIOPSY BY SHAVE METHOD

## 2024-06-18 ASSESSMENT — LOCATION SIMPLE DESCRIPTION DERM
LOCATION SIMPLE: ABDOMEN
LOCATION SIMPLE: LEFT UPPER BACK
LOCATION SIMPLE: CHEST
LOCATION SIMPLE: RIGHT UPPER BACK
LOCATION SIMPLE: LEFT SHOULDER

## 2024-06-18 ASSESSMENT — LOCATION DETAILED DESCRIPTION DERM
LOCATION DETAILED: MIDDLE STERNUM
LOCATION DETAILED: LEFT MEDIAL UPPER BACK
LOCATION DETAILED: LEFT ANTERIOR SHOULDER
LOCATION DETAILED: RIGHT MID-UPPER BACK
LOCATION DETAILED: UPPER STERNUM
LOCATION DETAILED: RIGHT SUPERIOR MEDIAL UPPER BACK
LOCATION DETAILED: RIGHT INFERIOR UPPER BACK
LOCATION DETAILED: LEFT LATERAL SUPERIOR CHEST
LOCATION DETAILED: LEFT MEDIAL SUPERIOR CHEST
LOCATION DETAILED: LEFT RIB CAGE

## 2024-06-18 ASSESSMENT — LOCATION ZONE DERM
LOCATION ZONE: TRUNK
LOCATION ZONE: ARM

## 2025-03-14 ENCOUNTER — TELEPHONE (OUTPATIENT)
Dept: INTERNAL MEDICINE | Facility: OTHER | Age: 70
End: 2025-03-14

## 2025-03-14 ENCOUNTER — OFFICE VISIT (OUTPATIENT)
Dept: INTERNAL MEDICINE | Facility: OTHER | Age: 70
End: 2025-03-14
Payer: MEDICARE

## 2025-03-14 VITALS
RESPIRATION RATE: 18 BRPM | HEART RATE: 92 BPM | HEIGHT: 66 IN | TEMPERATURE: 97 F | WEIGHT: 212.4 LBS | OXYGEN SATURATION: 80 % | DIASTOLIC BLOOD PRESSURE: 87 MMHG | SYSTOLIC BLOOD PRESSURE: 133 MMHG | BODY MASS INDEX: 34.13 KG/M2

## 2025-03-14 DIAGNOSIS — E06.3 HASHIMOTO'S THYROIDITIS: ICD-10-CM

## 2025-03-14 DIAGNOSIS — I10 BENIGN ESSENTIAL HYPERTENSION: ICD-10-CM

## 2025-03-14 DIAGNOSIS — Z13.228 SCREENING FOR METABOLIC DISORDER: ICD-10-CM

## 2025-03-14 DIAGNOSIS — E78.5 DYSLIPIDEMIA: ICD-10-CM

## 2025-03-14 DIAGNOSIS — B35.4 TINEA CORPORIS: ICD-10-CM

## 2025-03-14 DIAGNOSIS — D63.8 ANEMIA IN OTHER CHRONIC DISEASES CLASSIFIED ELSEWHERE: ICD-10-CM

## 2025-03-14 DIAGNOSIS — K25.9 GASTRIC ULCER WITHOUT HEMORRHAGE OR PERFORATION, UNSPECIFIED CHRONICITY: ICD-10-CM

## 2025-03-14 DIAGNOSIS — E55.9 VITAMIN D DEFICIENCY: ICD-10-CM

## 2025-03-14 DIAGNOSIS — E03.9 ACQUIRED HYPOTHYROIDISM: ICD-10-CM

## 2025-03-14 DIAGNOSIS — K21.9 GASTROESOPHAGEAL REFLUX DISEASE WITHOUT ESOPHAGITIS: ICD-10-CM

## 2025-03-14 PROBLEM — F51.09: Status: ACTIVE | Noted: 2020-08-19

## 2025-03-14 PROBLEM — M54.30 SCIATICA: Status: ACTIVE | Noted: 2023-07-04

## 2025-03-14 PROBLEM — H25.9 AGE-RELATED CATARACT: Status: ACTIVE | Noted: 2020-06-24

## 2025-03-14 PROBLEM — M23.50 CHRONIC INSTABILITY OF KNEE: Status: ACTIVE | Noted: 2020-06-24

## 2025-03-14 PROBLEM — M81.0 OSTEOPOROSIS: Status: ACTIVE | Noted: 2023-07-04

## 2025-03-14 PROBLEM — I49.3 PVC'S (PREMATURE VENTRICULAR CONTRACTIONS): Status: ACTIVE | Noted: 2022-08-10

## 2025-03-14 PROBLEM — G25.81 RESTLESS LEGS: Status: ACTIVE | Noted: 2020-08-19

## 2025-03-14 PROBLEM — G47.33 OSA (OBSTRUCTIVE SLEEP APNEA): Status: ACTIVE | Noted: 2022-12-28

## 2025-03-14 PROBLEM — J45.909 ASTHMA: Status: ACTIVE | Noted: 2022-08-24

## 2025-03-14 PROBLEM — J32.9 CHRONIC SINUSITIS: Status: ACTIVE | Noted: 2023-07-04

## 2025-03-14 PROBLEM — D64.9 ANEMIA: Status: ACTIVE | Noted: 2020-06-24

## 2025-03-14 PROBLEM — R26.81 UNSTEADY GAIT: Status: ACTIVE | Noted: 2022-12-28

## 2025-03-14 PROBLEM — E78.2 MIXED HYPERLIPIDEMIA: Status: ACTIVE | Noted: 2020-06-24

## 2025-03-14 PROBLEM — M19.90 ARTHRITIS: Status: ACTIVE | Noted: 2023-07-04

## 2025-03-14 PROBLEM — R25.1 TREMOR: Status: ACTIVE | Noted: 2021-02-28

## 2025-03-14 PROBLEM — Z85.3 HISTORY OF BREAST CANCER: Status: ACTIVE | Noted: 2022-06-07

## 2025-03-14 PROBLEM — J84.9 INTERSTITIAL LUNG DISEASE (HCC): Status: ACTIVE | Noted: 2023-06-01

## 2025-03-14 PROBLEM — I49.1 PREMATURE ATRIAL CONTRACTION: Status: ACTIVE | Noted: 2022-07-07

## 2025-03-14 PROBLEM — R60.0 LOWER EXTREMITY EDEMA: Status: ACTIVE | Noted: 2024-06-14

## 2025-03-14 PROBLEM — R00.2 PALPITATIONS: Status: ACTIVE | Noted: 2022-06-07

## 2025-03-14 PROBLEM — J30.9 ALLERGIC RHINITIS: Status: ACTIVE | Noted: 2020-06-24

## 2025-03-14 PROBLEM — B05.9 MEASLES: Status: ACTIVE | Noted: 2023-07-04

## 2025-03-14 PROBLEM — R74.8: Status: ACTIVE | Noted: 2023-04-25

## 2025-03-14 PROBLEM — F32.A DEPRESSIVE DISORDER: Status: ACTIVE | Noted: 2023-07-04

## 2025-03-14 PROBLEM — N18.31 STAGE 3A CHRONIC KIDNEY DISEASE: Status: ACTIVE | Noted: 2022-11-12

## 2025-03-14 PROBLEM — J42 CHRONIC BRONCHITIS (HCC): Status: ACTIVE | Noted: 2021-11-18

## 2025-03-14 PROCEDURE — 3079F DIAST BP 80-89 MM HG: CPT | Mod: GC

## 2025-03-14 PROCEDURE — 99204 OFFICE O/P NEW MOD 45 MIN: CPT | Mod: GC

## 2025-03-14 PROCEDURE — 3075F SYST BP GE 130 - 139MM HG: CPT | Mod: GC

## 2025-03-14 RX ORDER — LOSARTAN POTASSIUM 50 MG/1
1 TABLET ORAL DAILY
COMMUNITY
Start: 2025-02-04 | End: 2025-03-14 | Stop reason: SDUPTHER

## 2025-03-14 RX ORDER — CLOBETASOL PROPIONATE 0.5 MG/G
CREAM TOPICAL
COMMUNITY

## 2025-03-14 RX ORDER — DULOXETIN HYDROCHLORIDE 60 MG/1
60 CAPSULE, DELAYED RELEASE ORAL DAILY
COMMUNITY

## 2025-03-14 RX ORDER — LOSARTAN POTASSIUM 50 MG/1
50 TABLET ORAL DAILY
Qty: 30 TABLET | Refills: 2 | Status: SHIPPED | OUTPATIENT
Start: 2025-03-14 | End: 2025-03-17

## 2025-03-14 RX ORDER — LEVOTHYROXINE SODIUM 150 UG/1
150 TABLET ORAL
Qty: 30 TABLET | Refills: 1 | Status: SHIPPED | OUTPATIENT
Start: 2025-03-14

## 2025-03-14 RX ORDER — KETOCONAZOLE 20 MG/G
CREAM TOPICAL
Qty: 30 G | Refills: 1 | Status: SHIPPED | OUTPATIENT
Start: 2025-03-14

## 2025-03-14 RX ORDER — FUROSEMIDE 20 MG/1
TABLET ORAL
COMMUNITY

## 2025-03-14 RX ORDER — BUPROPION HYDROCHLORIDE 150 MG/1
150 TABLET, EXTENDED RELEASE ORAL DAILY
COMMUNITY

## 2025-03-14 RX ORDER — DIAZEPAM 5 MG/1
1 TABLET ORAL
COMMUNITY

## 2025-03-14 RX ORDER — ESOMEPRAZOLE MAGNESIUM 40 MG/1
40 CAPSULE, DELAYED RELEASE ORAL
Qty: 30 CAPSULE | Refills: 2 | Status: SHIPPED | OUTPATIENT
Start: 2025-03-14

## 2025-03-14 RX ORDER — TRIAMCINOLONE ACETONIDE 0.25 MG/ML
LOTION TOPICAL
COMMUNITY
End: 2025-03-14

## 2025-03-14 RX ORDER — SIMVASTATIN 20 MG
TABLET ORAL
COMMUNITY
Start: 2024-08-06

## 2025-03-14 RX ORDER — PHENAZOPYRIDINE HYDROCHLORIDE 100 MG/1
TABLET, FILM COATED ORAL
COMMUNITY

## 2025-03-14 RX ORDER — KETOCONAZOLE 20 MG/G
CREAM TOPICAL
COMMUNITY
End: 2025-03-14 | Stop reason: SDUPTHER

## 2025-03-14 RX ORDER — FLUTICASONE PROPIONATE 50 MCG
1 SPRAY, SUSPENSION (ML) NASAL DAILY
COMMUNITY
Start: 2024-06-14 | End: 2025-04-10

## 2025-03-14 RX ORDER — FLUTICASONE PROPIONATE 50 UG/1
POWDER, METERED RESPIRATORY (INHALATION)
COMMUNITY

## 2025-03-14 RX ORDER — ALBUTEROL SULFATE 90 UG/1
2 INHALANT RESPIRATORY (INHALATION) EVERY 6 HOURS PRN
COMMUNITY
End: 2025-03-14

## 2025-03-14 RX ORDER — VALACYCLOVIR HYDROCHLORIDE 1 G/1
TABLET, FILM COATED ORAL
COMMUNITY

## 2025-03-14 RX ORDER — BUPROPION HYDROCHLORIDE 150 MG/1
1 TABLET ORAL EVERY MORNING
COMMUNITY

## 2025-03-14 RX ORDER — LEVOTHYROXINE SODIUM 150 UG/1
150 TABLET ORAL
COMMUNITY
Start: 2025-01-09 | End: 2025-03-14 | Stop reason: SDUPTHER

## 2025-03-14 ASSESSMENT — PATIENT HEALTH QUESTIONNAIRE - PHQ9
9. THOUGHTS THAT YOU WOULD BE BETTER OFF DEAD, OR OF HURTING YOURSELF: NOT AT ALL
SUM OF ALL RESPONSES TO PHQ9 QUESTIONS 1 AND 2: 0
7. TROUBLE CONCENTRATING ON THINGS, SUCH AS READING THE NEWSPAPER OR WATCHING TELEVISION: NOT AT ALL
SUM OF ALL RESPONSES TO PHQ QUESTIONS 1-9: 0
5. POOR APPETITE OR OVEREATING: NOT AT ALL
4. FEELING TIRED OR HAVING LITTLE ENERGY: NOT AT ALL
3. TROUBLE FALLING OR STAYING ASLEEP OR SLEEPING TOO MUCH: NOT AT ALL
6. FEELING BAD ABOUT YOURSELF - OR THAT YOU ARE A FAILURE OR HAVE LET YOURSELF OR YOUR FAMILY DOWN: NOT AL ALL
2. FEELING DOWN, DEPRESSED, IRRITABLE, OR HOPELESS: NOT AT ALL
1. LITTLE INTEREST OR PLEASURE IN DOING THINGS: NOT AT ALL
8. MOVING OR SPEAKING SO SLOWLY THAT OTHER PEOPLE COULD HAVE NOTICED. OR THE OPPOSITE, BEING SO FIGETY OR RESTLESS THAT YOU HAVE BEEN MOVING AROUND A LOT MORE THAN USUAL: NOT AT ALL

## 2025-03-14 ASSESSMENT — ENCOUNTER SYMPTOMS
DEPRESSION: 0
SHORTNESS OF BREATH: 0
VOMITING: 0
CHILLS: 0
NERVOUS/ANXIOUS: 0
PALPITATIONS: 0
DIAPHORESIS: 0
INSOMNIA: 0
DIZZINESS: 0
SORE THROAT: 0
BLURRED VISION: 0
COUGH: 0
FEVER: 0
WHEEZING: 0
PHOTOPHOBIA: 0
CONSTIPATION: 0
DIARRHEA: 0
EYE PAIN: 0
HEADACHES: 0
HEARTBURN: 0
WEAKNESS: 0
NAUSEA: 0
ABDOMINAL PAIN: 0

## 2025-03-14 NOTE — ASSESSMENT & PLAN NOTE
Nexium ordered at patient's request as it is the only one that works for her. Has numerous intolerances to other PPIs.

## 2025-03-14 NOTE — PATIENT INSTRUCTIONS
Thank you for visiting [facility]!    Medications changes include refills for Losartan, Nexium, Ketoconazole, Levothyroxine.    I ordered some blood work to be done before your next appointment.     Please do not hesitate to contact me on MyChart should you need anything else!

## 2025-03-14 NOTE — ASSESSMENT & PLAN NOTE
Refilled Ketoconazole topical to be used as needed. She will continue to use talcum powder and maintaining dryness of the area to prevent recurrence of the fungal infection.

## 2025-03-14 NOTE — TELEPHONE ENCOUNTER
Patient's pharmacy contacted us and requested we change the directions and sig for the Losartan. It is mixed up, the directions don't match. Please change as soon as you can. I can also send a verbal if needed, I will be back in the clinic on Tuesday 3/18.    Thank you!

## 2025-03-14 NOTE — PROGRESS NOTES
OFFICE VISIT: INITIAL ENCOUNTER    Lidia Keen is a 69 y.o. female who presents today with the following:    Chief Complaint: Establish care, wants follow up labs    History of Present Illness:     Lidia Keen is a 68 y/o female with a past medical history of PTSD, alcohol use disorder, CONNER, depression, asthma, ILD, HTN, HLD, PUD, hypothyroidism, osteoporosis, ERIC who presents to establish care.    Patient reports she is looking for refills of her Losartan, Levothyroxine, Nexium, Ketoconazole. She is followed by a number of specialities including psychiatry and pulmonology. Patient is looking to acquire follow up labs as they were last done last year in June. No acute concerns or complaints reported today.     Review of Systems   Constitutional:  Negative for chills, diaphoresis, fever and malaise/fatigue.   HENT:  Negative for congestion, ear pain, hearing loss and sore throat.    Eyes:  Negative for blurred vision, photophobia and pain.   Respiratory:  Negative for cough, shortness of breath and wheezing.    Cardiovascular:  Negative for chest pain, palpitations and leg swelling.   Gastrointestinal:  Negative for abdominal pain, constipation, diarrhea, heartburn, nausea and vomiting.   Genitourinary:  Negative for dysuria and frequency.   Skin:  Negative for rash.   Neurological:  Negative for dizziness, weakness and headaches.   Psychiatric/Behavioral:  Negative for depression. The patient is not nervous/anxious and does not have insomnia.         Past Medical History:   Past Medical History:   Diagnosis Date    Anemia     Anxiety     Depression     Hyperlipidemia     Hypertension     Thyroid disease        Patient Active Problem List    Diagnosis Date Noted    Tinea corporis 03/14/2025    Vitamin D deficiency 06/14/2024    Lower extremity edema 06/14/2024    Arthritis 07/04/2023    Chronic sinusitis 07/04/2023    Depressive disorder 07/04/2023    Dyslipidemia 07/04/2023    Gastric ulcer  07/04/2023    Hypothyroidism 07/04/2023    Osteoporosis 07/04/2023    Sciatica 07/04/2023    Measles 07/04/2023    Interstitial lung disease (HCC) 06/01/2023    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) 04/25/2023    Unsteady gait 12/28/2022    ERIC (obstructive sleep apnea) 12/28/2022    Stage 3a chronic kidney disease 11/12/2022    Asthma 08/24/2022    PVC's (premature ventricular contractions) 08/10/2022    Premature atrial contraction 07/07/2022    History of breast cancer 06/07/2022    Palpitations 06/07/2022    Chronic bronchitis (HCC) 11/18/2021    Generalized anxiety disorder 10/18/2021    Tremor 02/28/2021    Benign essential hypertension 08/19/2020    Late insomnia 08/19/2020    Restless legs 08/19/2020    Posttraumatic stress disorder 07/07/2020    Severe episode of recurrent major depressive disorder (HCC) 07/07/2020    Alcohol consumption binge drinking 07/07/2020    Age-related cataract 06/24/2020    Allergic rhinitis 06/24/2020    Anemia 06/24/2020    Mixed hyperlipidemia 06/24/2020    Gastroesophageal reflux disease 06/24/2020    Hashimoto's thyroiditis 06/24/2020    Chronic instability of knee 06/24/2020       Past Surgical History:   Procedure Laterality Date    ARTHROSCOPIC TIBIAL PLATEAU FRACTURE REPAIR Right     BUNIONECTOMY Right     CERVICAL DISK AND FUSION ANTERIOR      C4-7, d/t herniated disc    ENDOMETRIAL ABLATION N/A     MASTECTOMY BILATERAL SUBQ Bilateral         Allergies:  Bacitracin, Doxycycline, Albuterol, Anastrozole, Aripiprazole, Brompheniramine, Buspirone, Cariprazine, Cephalexin, Clavulanic acid, Clonazepam, Cyclobenzaprine, Desonide, Dexlansoprazole, Diphenhydramine, Ephedrine, Escitalopram, Exemestane, Famotidine, Fexofenadine, Fluoxetine, Gabapentin, Ipratropium, Lamotrigine, Lithium, Lurasidone, Moxifloxacin, Mupirocin, Olanzapine, Oxycodone, Pimecrolimus, Pregabalin, Propranolol, Quetiapine, Rabeprazole, Risperidone, Ropinirole, Tiotropium  "bromide monohydrate, Tizanidine, Tolterodine, Topiramate, Tramadol, Trazodone, Valproic acid, Venlafaxine, Vilazodone, Ciprofloxacin, Clindamycin, and Metronidazole    Medications:     Current Outpatient Medications:     buPROPion SR, 150 mg, Oral, DAILY, Taking    Clobetasol Prop Emollient Base, Apply  topically., PRN    clobetasol, APPLY A THIN LAYER TO THE AFFECTED AREA(S) BY TOPICAL ROUTE 2 TIMES PER DAY, PRN    diazePAM, 1 Tablet, Oral, QDAY PRN, PRN    fluticasone, 1 Spray, Nasal, DAILY, Taking    Fluticasone Propionate Diskus, Inhale., Taking    furosemide, TAKE 1 TABLET BY MOUTH ONCE DAILY AS NEEDED FOR WEIGHT GAIN >2LBS, PRN    phenazopyridine, TAKE 1 TABLET BY MOUTH 3 TIMES A DAY FOR 2 DAYS AS NEEDED FOR DYSURIA, PRN    simvastatin, , Taking    valacyclovir, Take 1 tablet every 12 hours by oral route., PRN    DULoxetine, 60 mg, Oral, DAILY, Taking    levothyroxine, 150 mcg, Oral, AM ES, Taking    losartan, 50 mg, Oral, DAILY, Taking    ketoconazole, APPLY TO THE AFFECTED AREA(S) BY TOPICAL ROUTE ONCE DAILY, Taking    esomeprazole, 40 mg, Oral, QAM AC, Taking    DULoxetine, 120 mg, Oral, DAILY, Taking    buPROPion, 1 Tablet, Oral, QAM     Social History:   Social History     Tobacco Use    Smoking status: Never    Smokeless tobacco: Never   Vaping Use    Vaping status: Never Used   Substance Use Topics    Alcohol use: Yes     Alcohol/week: 1.8 oz     Types: 3 Shots of liquor per week     Comment: once a week    Drug use: Not Currently     Types: Marijuana       Family History:   Family History   Problem Relation Age of Onset    Alcohol abuse Mother     Prostate cancer Father     Pancreatic Cancer Father     Lupus Sister     Heart Attack Maternal Grandfather        Vitals:   /87   Pulse 92   Temp 36.1 °C (97 °F) (Temporal)   Resp 18   Ht 1.676 m (5' 6\")   Wt 96.3 kg (212 lb 6.4 oz)   SpO2 (!) 80%  Body mass index is 34.28 kg/m².    Physical Exam  Vitals and nursing note reviewed. "   Constitutional:       General: She is not in acute distress.     Appearance: She is not diaphoretic.      Comments: On 2L of supplemental O2   HENT:      Head: Normocephalic and atraumatic.      Mouth/Throat:      Mouth: Mucous membranes are moist.      Pharynx: Oropharynx is clear. No oropharyngeal exudate or posterior oropharyngeal erythema.   Cardiovascular:      Rate and Rhythm: Normal rate and regular rhythm.      Pulses: Normal pulses.      Heart sounds: Normal heart sounds. No murmur heard.     No gallop.   Pulmonary:      Effort: Pulmonary effort is normal. No respiratory distress.      Breath sounds: Normal breath sounds. No wheezing, rhonchi or rales.   Abdominal:      General: There is no distension.      Palpations: Abdomen is soft.      Tenderness: There is no abdominal tenderness.   Musculoskeletal:      Right lower leg: No edema.      Left lower leg: No edema.   Skin:     General: Skin is warm.      Findings: No rash.   Neurological:      General: No focal deficit present.      Mental Status: She is alert and oriented to person, place, and time.   Psychiatric:         Mood and Affect: Mood normal.          Labs:   N/A  Imaging:   N/A  Assessment and Plan    Anemia  CBC ordered to monitor. Did not have evidence of anemia based on her prior CBC.    Benign essential hypertension  BP well controlled on Losartan. Refills provided today.    Dyslipidemia  Currently on Simvastatin. Will recheck lipid panel for monitoring.    Gastric ulcer  Nexium ordered at patient's request as it is the only one that works for her. Has numerous intolerances to other PPIs.    Vitamin D deficiency  Previously found to be low. Not currently on supplements. Will recheck level before recommending supplementation.    Hypothyroidism  Last TSH was WNL. Refilled Levothyroxine today. Will recheck TSH for monitoring.    Tinea corporis  Refilled Ketoconazole topical to be used as needed. She will continue to use talcum powder and  maintaining dryness of the area to prevent recurrence of the fungal infection.       Orders Placed This Encounter    VITAMIN D,25 HYDROXY (DEFICIENCY)    Comp Metabolic Panel    Lipid Profile    TSH WITH REFLEX TO FT4    CBC WITHOUT DIFFERENTIAL    HEMOGLOBIN A1C    DISCONTD: albuterol (VENTOLIN HFA) 108 (90 Base) MCG/ACT Aero Soln inhalation aerosol    buPROPion (WELLBUTRIN XL) 150 MG XL tablet    buPROPion SR (WELLBUTRIN-SR) 150 MG TABLET SR 12 HR sustained-release tablet    Clobetasol Prop Emollient Base 0.05 % Cream    clobetasol (TEMOVATE) 0.05 % Cream    diazePAM (VALIUM) 5 MG Tab    fluticasone (FLONASE) 50 MCG/ACT nasal spray    Fluticasone Propionate Diskus 50 MCG/ACT AEROSOL POWDER, BREATH ACTIVATED    furosemide (LASIX) 20 MG Tab    DISCONTD: ketoconazole (NIZORAL) 2 % Cream    DISCONTD: losartan (COZAAR) 50 MG Tab    phenazopyridine (PYRIDIUM) 100 MG Tab    simvastatin (ZOCOR) 20 MG Tab    DISCONTD: Triamcinolone Acetonide 0.025 % Lotion    valacyclovir (VALTREX) 1 GM Tab    DULoxetine (CYMBALTA) 60 MG Cap DR Particles delayed-release capsule    DISCONTD: levothyroxine (SYNTHROID) 150 MCG Tab    DISCONTD: Esomeprazole Magnesium (NEXIUM 24HR PO)    levothyroxine (SYNTHROID) 150 MCG Tab    losartan (COZAAR) 50 MG Tab    ketoconazole (NIZORAL) 2 % Cream    esomeprazole (NEXIUM) 40 MG delayed-release capsule       Return in about 5 weeks (around 4/18/2025) for w/ PCP.      Please note that this dictation was created using voice recognition software. I have made every reasonable attempt to correct obvious errors, but I expect that there are errors of grammar and possibly content that I did not discover before finalizing the note.    Patient case was seen/ assessed/ discussed with Dr. Waldrop    Signed by:    Fredy Ambrosio DO    PGY-1 Internal Medicine Resident

## 2025-03-14 NOTE — LETTER
[facility]  Fredy Ambrosio D.O.  [address]  [place] [address]  Fax: [phone]   Authorization for Release/Disclosure of   Protected Health Information   Name: LIDIA VAIL : 1955 SSN: [id number]   Address: [address]  [place] [address] Phone:    [phone] (home)    I authorize the entity listed below to release/disclose the PHI below to:   [facility]/Fredy Ambrosio D.O. and Fredy Ambrosio D.O.   Provider or Entity Name:     Address   City, State, Zip   Phone:      Fax:     Reason for request: continuity of care   Information to be released:    [  ] LAST COLONOSCOPY,  including any PATH REPORT and follow-up  [  ] LAST FIT/COLOGUARD RESULT [  ] LAST DEXA  [  ] LAST MAMMOGRAM  [  ] LAST PAP  [  ] LAST LABS [  ] RETINA EXAM REPORT  [  ] IMMUNIZATION RECORDS  [  ] Release all info      [  ] Check here and initial the line next to each item to release ALL health information INCLUDING  _____ Care and treatment for drug and / or alcohol abuse  _____ HIV testing, infection status, or AIDS  _____ Genetic Testing    DATES OF SERVICE OR TIME PERIOD TO BE DISCLOSED: _____________  I understand and acknowledge that:  * This Authorization may be revoked at any time by you in writing, except if your health information has already been used or disclosed.  * Your health information that will be used or disclosed as a result of you signing this authorization could be re-disclosed by the recipient. If this occurs, your re-disclosed health information may no longer be protected by State or Federal laws.  * You may refuse to sign this Authorization. Your refusal will not affect your ability to obtain treatment.  * This Authorization becomes effective upon signing and will  on (date) __________.      If no date is indicated, this Authorization will  one (1) year from the signature date.    Name: Lidia Vail  Signature: Date:   3/14/2025     PLEASE FAX REQUESTED RECORDS BACK TO: [phone]  [phone]

## 2025-03-14 NOTE — ASSESSMENT & PLAN NOTE
Previously found to be low. Not currently on supplements. Will recheck level before recommending supplementation.

## 2025-03-17 RX ORDER — LOSARTAN POTASSIUM 50 MG/1
TABLET ORAL
Qty: 30 TABLET | Refills: 2 | Status: SHIPPED | OUTPATIENT
Start: 2025-03-17

## 2025-03-18 ENCOUNTER — TELEPHONE (OUTPATIENT)
Dept: INTERNAL MEDICINE | Facility: OTHER | Age: 70
End: 2025-03-18
Payer: MEDICARE

## 2025-03-18 NOTE — TELEPHONE ENCOUNTER
Release of information  paperwork received from appointment requiring provider signature.      All appropriate fields completed by Medical Assistant: Yes    Paperwork handed to provider to sign then faxed to [phone] & [phone]

## 2025-04-16 DIAGNOSIS — E55.9 VITAMIN D DEFICIENCY: ICD-10-CM

## 2025-04-16 DIAGNOSIS — D63.8 ANEMIA IN OTHER CHRONIC DISEASES CLASSIFIED ELSEWHERE: ICD-10-CM

## 2025-04-16 DIAGNOSIS — I10 BENIGN ESSENTIAL HYPERTENSION: ICD-10-CM

## 2025-04-16 DIAGNOSIS — E78.5 DYSLIPIDEMIA: ICD-10-CM

## 2025-04-16 DIAGNOSIS — E06.3 HASHIMOTO'S THYROIDITIS: ICD-10-CM

## 2025-04-16 DIAGNOSIS — E03.9 ACQUIRED HYPOTHYROIDISM: ICD-10-CM

## 2025-04-16 DIAGNOSIS — Z13.228 SCREENING FOR METABOLIC DISORDER: ICD-10-CM

## 2025-04-22 ENCOUNTER — OFFICE VISIT (OUTPATIENT)
Dept: INTERNAL MEDICINE | Facility: OTHER | Age: 70
End: 2025-04-22
Payer: MEDICARE

## 2025-04-22 VITALS
WEIGHT: 205.2 LBS | TEMPERATURE: 97.1 F | BODY MASS INDEX: 32.98 KG/M2 | HEIGHT: 66 IN | OXYGEN SATURATION: 99 % | DIASTOLIC BLOOD PRESSURE: 79 MMHG | SYSTOLIC BLOOD PRESSURE: 112 MMHG | HEART RATE: 86 BPM

## 2025-04-22 DIAGNOSIS — M81.0 AGE-RELATED OSTEOPOROSIS WITHOUT CURRENT PATHOLOGICAL FRACTURE: ICD-10-CM

## 2025-04-22 DIAGNOSIS — M54.42 CHRONIC BILATERAL LOW BACK PAIN WITH LEFT-SIDED SCIATICA: ICD-10-CM

## 2025-04-22 DIAGNOSIS — I10 BENIGN ESSENTIAL HYPERTENSION: ICD-10-CM

## 2025-04-22 DIAGNOSIS — M48.04 THORACIC SPINAL STENOSIS: ICD-10-CM

## 2025-04-22 DIAGNOSIS — E78.2 MIXED HYPERLIPIDEMIA: ICD-10-CM

## 2025-04-22 DIAGNOSIS — E03.4 HYPOTHYROIDISM DUE TO ACQUIRED ATROPHY OF THYROID: ICD-10-CM

## 2025-04-22 DIAGNOSIS — M50.20 CERVICAL HERNIATED DISC: ICD-10-CM

## 2025-04-22 DIAGNOSIS — Z00.00 HEALTHCARE MAINTENANCE: ICD-10-CM

## 2025-04-22 DIAGNOSIS — N18.31 STAGE 3A CHRONIC KIDNEY DISEASE: ICD-10-CM

## 2025-04-22 DIAGNOSIS — I89.0 LYMPHEDEMA: ICD-10-CM

## 2025-04-22 DIAGNOSIS — Z12.11 COLON CANCER SCREENING: ICD-10-CM

## 2025-04-22 DIAGNOSIS — Z13.820 OSTEOPOROSIS SCREENING: ICD-10-CM

## 2025-04-22 DIAGNOSIS — F33.2 SEVERE EPISODE OF RECURRENT MAJOR DEPRESSIVE DISORDER, WITHOUT PSYCHOTIC FEATURES (HCC): ICD-10-CM

## 2025-04-22 DIAGNOSIS — E78.5 DYSLIPIDEMIA: ICD-10-CM

## 2025-04-22 DIAGNOSIS — G89.29 CHRONIC BILATERAL LOW BACK PAIN WITH LEFT-SIDED SCIATICA: ICD-10-CM

## 2025-04-22 DIAGNOSIS — J84.9 INTERSTITIAL LUNG DISEASE (HCC): ICD-10-CM

## 2025-04-22 PROBLEM — K25.9 GASTRIC ULCER: Status: RESOLVED | Noted: 2023-07-04 | Resolved: 2025-04-22

## 2025-04-22 PROBLEM — F32.A DEPRESSIVE DISORDER: Status: RESOLVED | Noted: 2023-07-04 | Resolved: 2025-04-22

## 2025-04-22 PROCEDURE — 3078F DIAST BP <80 MM HG: CPT

## 2025-04-22 PROCEDURE — 3074F SYST BP LT 130 MM HG: CPT

## 2025-04-22 PROCEDURE — 99214 OFFICE O/P EST MOD 30 MIN: CPT | Mod: GC

## 2025-04-22 RX ORDER — LOSARTAN POTASSIUM 50 MG/1
TABLET ORAL
Qty: 45 TABLET | Refills: 2 | Status: SHIPPED | OUTPATIENT
Start: 2025-04-22

## 2025-04-22 RX ORDER — SIMVASTATIN 40 MG
40 TABLET ORAL NIGHTLY
Qty: 100 TABLET | Refills: 3 | Status: SHIPPED | OUTPATIENT
Start: 2025-04-22 | End: 2026-05-27

## 2025-04-22 ASSESSMENT — ENCOUNTER SYMPTOMS
HEARTBURN: 0
NERVOUS/ANXIOUS: 0
WEAKNESS: 0
SORE THROAT: 0
FEVER: 0
NAUSEA: 0
INSOMNIA: 0
COUGH: 0
VOMITING: 0
ABDOMINAL PAIN: 0
WHEEZING: 0
HEADACHES: 0
CONSTIPATION: 0
PALPITATIONS: 0
DIZZINESS: 0
PHOTOPHOBIA: 0
CHILLS: 0
BLURRED VISION: 0
EYE PAIN: 0
DEPRESSION: 0
DIAPHORESIS: 0
SHORTNESS OF BREATH: 1
DIARRHEA: 0

## 2025-04-22 NOTE — PROGRESS NOTES
"    OFFICE VISIT    Lidia Keen is a 69 y.o. female who presents today with the following:    Reason for visit: Follow up regarding lab results    HPI:    Patient reports that she continues to have trouble breathing and feeling overly fatigued throughout the day. She does not use the Fluticasone nasal spray everyday as it worsens her leg edema and can cause thrush. Options are quite limited due to her allergy to all THOMAS, LABA products. She plans to follow up with her pulm doctor soon.    She asks if the clinic can off trigger point injections to her back as that has been helpful in the past. Patient has a history of multiple surgeries on her cervical and thoracic spine for herniated discs and spinal stenosis.     Review of Systems   Constitutional:  Positive for malaise/fatigue. Negative for chills, diaphoresis and fever.   HENT:  Negative for congestion, ear pain, hearing loss and sore throat.    Eyes:  Negative for blurred vision, photophobia and pain.   Respiratory:  Positive for shortness of breath. Negative for cough and wheezing.    Cardiovascular:  Positive for leg swelling (b/l). Negative for chest pain and palpitations.   Gastrointestinal:  Negative for abdominal pain, constipation, diarrhea, heartburn, nausea and vomiting.   Genitourinary:  Negative for dysuria and frequency.   Skin:  Negative for rash.   Neurological:  Negative for dizziness, weakness and headaches.   Psychiatric/Behavioral:  Negative for depression. The patient is not nervous/anxious and does not have insomnia.        Vitals:   /79 (BP Location: Right arm, Patient Position: Sitting, BP Cuff Size: Large adult)   Pulse 86   Temp 36.2 °C (97.1 °F) (Temporal)   Ht 1.676 m (5' 6\")   Wt 93.1 kg (205 lb 3.2 oz)   SpO2 99%   BMI 33.12 kg/m²     Physical Exam  Vitals and nursing note reviewed.   Constitutional:       General: She is not in acute distress.     Appearance: She is not diaphoretic.   HENT:      Head: Normocephalic " and atraumatic.      Mouth/Throat:      Mouth: Mucous membranes are moist.      Pharynx: Oropharynx is clear. No oropharyngeal exudate or posterior oropharyngeal erythema.   Cardiovascular:      Rate and Rhythm: Normal rate and regular rhythm.      Pulses: Normal pulses.      Heart sounds: Normal heart sounds. No murmur heard.     No gallop.   Pulmonary:      Effort: Pulmonary effort is normal. No respiratory distress.      Breath sounds: Normal breath sounds. No wheezing, rhonchi or rales.   Abdominal:      General: There is no distension.      Palpations: Abdomen is soft.      Tenderness: There is no abdominal tenderness.   Musculoskeletal:      Right lower leg: Edema (nonpitting) present.      Left lower leg: Edema (nonpitting) present.   Skin:     General: Skin is warm.      Findings: No rash.   Neurological:      General: No focal deficit present.      Mental Status: She is alert and oriented to person, place, and time.   Psychiatric:         Mood and Affect: Mood normal.         Assessment and Plan:     Benign essential hypertension  BP well controlled on Losartan. Adjusted med refill for 45 tabs a month.    Dyslipidemia  Reviewed lipid panel results which showed elevated LDL at 139 and total cholesterol 236. ASCVD risk score was 11.7%.  -Will increase Simvastatin to 40 mg QHS  -Will recheck lipid panel in 6 months    Stage 3a chronic kidney disease  GFR was 59 which is stable from prior. Will continue to monitor. Continue Losartan as prescribed.    Interstitial lung disease (HCC)  Patient to follow up with pulmonology to discuss further medical management and renewing oxygen.  -Will refer to pulm rehab    Lymphedema  Will refer patient to lymphedema clinic for compression stockings and management of chronic issue.   Recommended elevating legs when able.  Medical therapy is not typically recommended to lymphedema.    Osteoporosis  Currently on no medications. Unsure of history. Will recheck with DEXA  scan.    Thoracic spinal stenosis  Hx of multiple spinal surgeries on her cervical and thoracic spine. Will refer to pain management for trigger point injections.     Hypothyroidism  Recent TSH was WNL. Will continue current dose of Levothyroxine.    Severe episode of recurrent major depressive disorder (HCC)  Followed closely with psychiatry who manages Valium, Cymbalta, Wellbutrin. Will defer any further changes to psychiatry.    Healthcare maintenance  Cologuard ordered for colon cancer screening.      Orders Placed This Encounter    DS-BONE DENSITY STUDY (DEXA)    Cologuard® colon cancer screening    Referral to Pulmonary Rehab    Referral to Pain Management    REFERRAL TO LYMPhEDEMA-PHYSICAL THERAPY    simvastatin (ZOCOR) 40 MG Tab    losartan (COZAAR) 50 MG Tab       Return in about 6 weeks (around 6/3/2025) for w/ PCP for annual wellness.      Patient case was seen/ assessed/ discussed with Dr. Osborn      Please note that this dictation was created using voice recognition software. I have made every reasonable attempt to correct obvious errors, but I expect that there are errors of grammar and possibly content that I did not discover before finalizing the note.       Signed by:    Fredy Ambrosio DO    PGY-1 Internal Medicine Resident

## 2025-04-23 NOTE — ASSESSMENT & PLAN NOTE
Patient to follow up with pulmonology to discuss further medical management and renewing oxygen.  -Will refer to pulm rehab

## 2025-04-23 NOTE — ASSESSMENT & PLAN NOTE
Followed closely with psychiatry who manages Valium, Cymbalta, Wellbutrin. Will defer any further changes to psychiatry.

## 2025-04-23 NOTE — ASSESSMENT & PLAN NOTE
Reviewed lipid panel results which showed elevated LDL at 139 and total cholesterol 236. ASCVD risk score was 11.7%.  -Will increase Simvastatin to 40 mg QHS  -Will recheck lipid panel in 6 months

## 2025-04-23 NOTE — ASSESSMENT & PLAN NOTE
Will refer patient to lymphedema clinic for compression stockings and management of chronic issue.   Recommended elevating legs when able.  Medical therapy is not typically recommended to lymphedema.

## 2025-04-23 NOTE — PATIENT INSTRUCTIONS
Thank you for visiting [facility]!    Medications changes include increasing Simvastatin to 40 mg once daily, refilling Losartan for 45 tabs a month.     I made a referral to pulmonary rehab and the lymphedema clinic.    Cologuard, DEXA scan were ordered.    Follow up with your pulmonologist and psychiatrist for further adjustments to your medications.     Please do not hesitate to contact me on MyChart should you need anything else!

## 2025-04-23 NOTE — ASSESSMENT & PLAN NOTE
Hx of multiple spinal surgeries on her cervical and thoracic spine. Will refer to pain management for trigger point injections.

## 2025-04-30 NOTE — Clinical Note
REFERRAL APPROVAL NOTICE         Sent on April 30, 2025                   Lidia Keen  [address]  [place] [place] [address]                   Dear Ms. Keen,    After a careful review of the medical information and benefit coverage, [facility] has processed your referral. See below for additional details.    If applicable, you must be actively enrolled with your insurance for coverage of the authorized service. If you have any questions regarding your coverage, please contact your insurance directly.    REFERRAL INFORMATION   Referral #:  [id number]  Referred-To Department    Referred-By Provider:  Physical Therapy    Fredy Ambrosio D.O.   [facility] [address]      [address]  [place] [address]  [phone] [address]  [address]  [place] [place] [place] [id number] [phone]    Referral Start Date:  04/22/2025  Referral End Date:   04/22/2026             SCHEDULING  If you do not already have an appointment, please call [phone] to make an appointment.     MORE INFORMATION  If you do not already have a [employer] account, sign up at: [email or web address]  You can access your medical information, make appointments, see lab results, billing information, and more.  If you have questions regarding this referral, please contact  the [facility] Referrals department at:             [phone]. Monday - Friday 8:00AM - 5:00PM.     Sincerely,    [facility]

## 2025-05-02 ENCOUNTER — APPOINTMENT (OUTPATIENT)
Dept: PHYSICAL MEDICINE AND REHAB | Facility: MEDICAL CENTER | Age: 70
End: 2025-05-02
Payer: MEDICARE

## 2025-05-07 NOTE — Clinical Note
REFERRAL APPROVAL NOTICE         Sent on May 7, 2025                   Lidia Keen  [address]  [place] [address]                   Dear Ms. Keen,    After a careful review of the medical information and benefit coverage, [facility] has processed your referral. See below for additional details.    If applicable, you must be actively enrolled with your insurance for coverage of the authorized service. If you have any questions regarding your coverage, please contact your insurance directly.    REFERRAL INFORMATION   Referral #:  [id number]  Referred-To Department    Referred-By Provider:  Pulmonology    Fredy Ambrosio D.O.   Pulmonary/sleep [facility]      [address]  [place] [address]  [phone] [address]  [place] [address] [phone]    Referral Start Date:  04/22/2025  Referral End Date:   04/22/2026           SCHEDULING  If you do not already have an appointment, please call [phone] to make an appointment.   MORE INFORMATION  As a reminder, [facility] ownership has changed, meaning this location is now owned and operated by [facility]. As such, we want to clarify that our patients should expect to receive two separate bills for the services received at [facility] - one representing the [facility] facility fees as the owner of the establishment, and the other to represent the physician's services and subsequent fees. You can speak with your insurance carrier for a pricing estimate by calling the customer service number on the back of your card and ask about charges for a hospital outpatient visit.  If you do not already have a [employer] account, sign up at: [email or web address]  You can access your medical information, make appointments, see lab results, billing information, and more.  If you have  questions regarding this referral, please contact  the [facility] department at:             [phone]. Monday - Friday 7:30AM - 5:00PM.      Sincerely,  [facility]     (3) walks occasionally

## 2025-05-22 NOTE — PROGRESS NOTES
Verbal consent was acquired by the patient to use [employer] ambient listening note generation during this visit Yes     NEW PATIENT VISIT     Interventional Spine and Pain  Physiatry (Physical Medicine and Rehabilitation)     Date of Service: See Epic  Chief Complaint:   Chief Complaint   Patient presents with    New Patient     :Thoracic spinal stenosis, Chronic bilateral low back pain with left-sided sciatica, Cervical herniated disc       Referring Provider: Fredy Ambrosio D.O.   Patient Name: Lidia Keen   : 1955   MRN: [id number]     History:     HPI:     Lidia Keen is a 69 y.o. female with history of CKD3, depression and anxiety, ILD, GERD, breast cancer, ERIC, osteoporosis who presents to clinic for evaluation of back pain.     History of Present Illness  The patient is a 69-year-old female who presents today for evaluation of back pain. She reports an over 25-year history of pain in the cervical, thoracic, and lumbar regions. She has previously undergone successful radiofrequency ablations in the cervical, thoracic, and lumbar regions though she reports previous epidurals were not successful for her. Pain is currently most bothersome in the right greater than left low back and buttock and is described as an aching pain around 3-7 out of 10 in intensity, worse with prolonged standing, walking, bending forward, or backward and somewhat relieved with sitting, or laying down. She has a long history of back pain. An x-ray decades ago revealed a slight forward displacement of L4. Her back issues began at age 17 following a fall while carrying her baby and a diaper bag down icy steps. She took Advil and continued her day. She was on long-term disability for [employer] and had 3 years to transition to Social Security disability. Dr. Nguyễn in [place], [place], performed an MRI that revealed herniated discs in her cervical spine leading to the placement of a C4-7 titanium plate and screws around  2004. After this procedure, excessive upper body activity would result in severe headaches unrelieved by Tylenol. Trigger point injections in her shoulders and sides of her back provided relief, which were last performed around a year ago. She was referred back to her primary care physician for RFA. She underwent RFA's with Dr. Quick in [place], receiving treatment on both sides from T4-T7. She also received treatment in [place], including a left L4 injection. She moved here and found the Medicare rules too cumbersome for minimal benefit, so she decided not to continue RFAs. She finds her lumbar brace extremely helpful for her back pain. She was on Darvocet for 20 to 30 years for endometriosis and severe pain. She conducted a personal test comparing Tylenol 3 and 4 with codeine and found that only the Tylenol was effective, while the codeine induced sleepiness. She has not received any injections in her right knee but has had one behind the patella in her left knee. She has not yet undergone a DEXA scan but this is scheduled. Her podiatrist diagnosed a stress fracture in her left foot due to excessive walking, which occasionally causes discomfort due to soft tissue damage. She compensates by shifting her weight to the back inside and rolling her foot outward, necessitating the use of a cane. She has been experiencing new hip pain on the right side for the past several weeks. She has a history of bunionectomy in eighth grade following a soccer injury, a tibial plateau compression fracture on the right from a horse-riding incident, and bilateral carpal tunnel surgeries due to repetitive motion injuries.     She was diagnosed with interstitial lung disease 3 years ago, which was stable on last year's CT scan. However, she needs 2 more years of stability before being considered stable. She is allergic to all albuterol, which causes her to wheeze. She can only use her steroid nasal spray once every 4 days due to side  effects such as yeast infection, swelling, and weight gain. She has lymphedema rather than edema and is questioning whether she should continue taking Lasix.    ALLERGIES  The patient is allergic to all ALBUTEROLS.    MEDICATIONS  Past: Darvocet     She is currently on duloxetine.    Red Flags ROS:   Fever, Chills, Sweats: Chronic per patient  Involuntary Weight Loss: Denies  Bladder Incontinence: Denies  Bowel Incontinence: Denies       Medical Records Review:  I reviewed the note from the referring provider Fredy Ambrosio D.O. including the note dated 4/22/25 where they discussed back pain.       PMHx:   Past Medical History:   Diagnosis Date    Anemia     Anxiety     Depression     Hyperlipidemia     Hypertension     Thyroid disease        Current Outpatient Medications on File Prior to Visit   Medication Sig Dispense Refill    levothyroxine (SYNTHROID) 150 MCG Tab TAKE 1 TABLET BY MOUTH IN THE MORNING ON AN EMPTY STOMACH 30 Tablet 0    simvastatin (ZOCOR) 40 MG Tab Take 1 Tablet by mouth every evening. 100 Tablet 3    losartan (COZAAR) 50 MG Tab Take 1 and 1/2 tablets daily 45 Tablet 2    buPROPion (WELLBUTRIN XL) 150 MG XL tablet Take 1 Tablet by mouth every morning.      clobetasol (TEMOVATE) 0.05 % Cream APPLY A THIN LAYER TO THE AFFECTED AREA(S) BY TOPICAL ROUTE 2 TIMES PER DAY      diazePAM (VALIUM) 5 MG Tab Take 1 Tablet by mouth 1 time a day as needed.      furosemide (LASIX) 20 MG Tab TAKE 1 TABLET BY MOUTH ONCE DAILY AS NEEDED FOR WEIGHT GAIN >2LBS      phenazopyridine (PYRIDIUM) 100 MG Tab TAKE 1 TABLET BY MOUTH 3 TIMES A DAY FOR 2 DAYS AS NEEDED FOR DYSURIA      valacyclovir (VALTREX) 1 GM Tab Take 1 tablet every 12 hours by oral route.      ketoconazole (NIZORAL) 2 % Cream APPLY TO THE AFFECTED AREA(S) BY TOPICAL ROUTE ONCE DAILY 30 g 1    esomeprazole (NEXIUM) 40 MG delayed-release capsule Take 1 Capsule by mouth every morning before breakfast. 30 Capsule 2    DULoxetine (CYMBALTA) 60 MG Cap DR  Particles delayed-release capsule Take 120 mg by mouth every day.      Fluticasone Propionate Diskus 50 MCG/ACT AEROSOL POWDER, BREATH ACTIVATED Inhale. (Patient not taking: Reported on 5/27/2025)       No current facility-administered medications on file prior to visit.        PSHx:   Past Surgical History:   Procedure Laterality Date    ARTHROSCOPIC TIBIAL PLATEAU FRACTURE REPAIR Right     BUNIONECTOMY Right     CERVICAL DISK AND FUSION ANTERIOR      C4-7, d/t herniated disc    ENDOMETRIAL ABLATION N/A     MASTECTOMY BILATERAL SUBQ Bilateral        Family history   Family History   Problem Relation Age of Onset    Alcohol abuse Mother     Prostate cancer Father     Pancreatic Cancer Father     Lupus Sister     Heart Attack Maternal Grandfather          Medications: Reviewed on [facility]  Outpatient Medications Marked as Taking for the 5/27/25 encounter (Office Visit) with Maritza Padron M.D.   Medication Sig Dispense Refill    levothyroxine (SYNTHROID) 150 MCG Tab TAKE 1 TABLET BY MOUTH IN THE MORNING ON AN EMPTY STOMACH 30 Tablet 0    simvastatin (ZOCOR) 40 MG Tab Take 1 Tablet by mouth every evening. 100 Tablet 3    losartan (COZAAR) 50 MG Tab Take 1 and 1/2 tablets daily 45 Tablet 2    buPROPion (WELLBUTRIN XL) 150 MG XL tablet Take 1 Tablet by mouth every morning.      clobetasol (TEMOVATE) 0.05 % Cream APPLY A THIN LAYER TO THE AFFECTED AREA(S) BY TOPICAL ROUTE 2 TIMES PER DAY      diazePAM (VALIUM) 5 MG Tab Take 1 Tablet by mouth 1 time a day as needed.      furosemide (LASIX) 20 MG Tab TAKE 1 TABLET BY MOUTH ONCE DAILY AS NEEDED FOR WEIGHT GAIN >2LBS      phenazopyridine (PYRIDIUM) 100 MG Tab TAKE 1 TABLET BY MOUTH 3 TIMES A DAY FOR 2 DAYS AS NEEDED FOR DYSURIA      valacyclovir (VALTREX) 1 GM Tab Take 1 tablet every 12 hours by oral route.      ketoconazole (NIZORAL) 2 % Cream APPLY TO THE AFFECTED AREA(S) BY TOPICAL ROUTE ONCE DAILY 30 g 1    esomeprazole (NEXIUM) 40 MG delayed-release capsule Take 1 Capsule  by mouth every morning before breakfast. 30 Capsule 2    DULoxetine (CYMBALTA) 60 MG Cap DR Particles delayed-release capsule Take 120 mg by mouth every day.          Allergies:   Allergies   Allergen Reactions    Bacitracin Swelling    Doxycycline Swelling    Albuterol     Anastrozole     Aripiprazole     Brompheniramine     Buspirone     Cariprazine     Cephalexin Diarrhea and Unspecified    Clavulanic Acid     Clonazepam     Cyclobenzaprine     Desonide     Dexlansoprazole     Diphenhydramine     Ephedrine     Escitalopram     Exemestane     Famotidine     Fexofenadine     Fluoxetine     Gabapentin     Ipratropium     Lamotrigine     Lithium     Lurasidone     Moxifloxacin     Mupirocin     Olanzapine     Oxycodone     Pimecrolimus     Pregabalin     Propranolol     Quetiapine     Rabeprazole     Risperidone     Ropinirole     Tiotropium Bromide Monohydrate     Tizanidine Unspecified     Severe sweating and dizziness and weakness.    Tolterodine     Topiramate     Tramadol     Trazodone     Valproic Acid     Venlafaxine     Vilazodone Unspecified    Ciprofloxacin Diarrhea    Clindamycin Diarrhea    Metronidazole Unspecified       Social Hx:   Social History     Socioeconomic History    Marital status:      Spouse name: Not on file    Number of children: Not on file    Years of education: Not on file    Highest education level: Not on file   Occupational History    Not on file   Tobacco Use    Smoking status: Never    Smokeless tobacco: Never   Vaping Use    Vaping status: Never Used   Substance and Sexual Activity    Alcohol use: Yes     Alcohol/week: 1.8 oz     Types: 3 Shots of liquor per week     Comment: once a week    Drug use: Not Currently     Types: Marijuana    Sexual activity: Not on file   Other Topics Concern    Not on file   Social History Narrative    Not on file     Social Drivers of Health     Financial Resource Strain: Not on file   Food Insecurity: Not on file   Transportation Needs: Not  "on file   Physical Activity: Not on file   Stress: Not on file   Social Connections: Not on file   Intimate Partner Violence: Not on file   Housing Stability: Not on file          EXAMINATION     Physical Exam:   Vitals: /78 (BP Location: Right arm, Patient Position: Sitting, BP Cuff Size: Adult long)   Pulse 95   Temp 36.4 °C (97.5 °F) (Temporal)   Resp 16   Ht 1.676 m (5' 6\")   Wt 93 kg (205 lb)   SpO2 94%       Constitutional:   Body Habitus: Body mass index is 33.09 kg/m².  Cooperation: Fully cooperates with exam  Appearance: Well-groomed, well-nourished  Eyes: No scleral icterus to suggest severe liver disease, no proptosis to suggest severe hyperthyroid  ENT: No obvious auditory deficits, no obvious tongue lesions, tongue midline, no facial droop   Respiratory:  Breathing comfortable on room air, no audible wheezing  Cardiovascular: Skin appears well-perfused  Psychiatric: Appropriate affect  Gait: slow, antalgic gait, ambulates with single point cane in right hand    Neurologic:  Strength:    Bilateral UE 5/5 in shoulder abductors, elbow extensors and flexors, wrist extensors, finger abductors, and finger flexors   Left hip flexors 4/5, right hip flexors 5/5, bilateral LE 5/5 in knee extensors and flexors, ankle dorsiflexors and plantarflexors  Sensation: decreased sensation throughout left lower extremity compared to right  MSK:?TTP across cervical axial spinous processes and paraspinals bilaterally, pain with right hip IR>ER    Special?tests:    Positive right>left facet loading maneuvers      MEDICAL DECISION MAKING    Medical Records Review: See under HPI section    DATA    Labs:   Lab Results   Component Value Date/Time    SODIUM 132 (L) 07/20/2022 02:50 PM    POTASSIUM 3.6 07/20/2022 02:50 PM    CHLORIDE 103 07/20/2022 02:50 PM    CO2 21.0 07/20/2022 02:50 PM    ANION 8.0 07/20/2022 02:50 PM    GLUCOSE 116 07/20/2022 02:50 PM    BUN 12.0 07/20/2022 02:50 PM    CREATININE 1.1 (H) 07/20/2022 " 02:50 PM    CALCIUM 8.5 (L) 07/20/2022 02:50 PM    ASTSGOT 35 07/20/2022 02:50 PM    ALTSGPT 29 07/20/2022 02:50 PM    TBILIRUBIN 0.3 07/20/2022 02:50 PM    ALBUMIN 3.4 (L) 07/20/2022 02:50 PM    ALBUMIN 3.7 01/19/2021 12:05 PM    TOTPROTEIN 6.8 07/20/2022 02:50 PM    AGRATIO 1.0 07/20/2022 02:50 PM       Lab Results   Component Value Date/Time    RBC 4.18 07/12/2021 04:15 PM    HEMOGLOBIN 13.8 07/12/2021 04:15 PM    HEMATOCRIT 41.3 07/12/2021 04:15 PM    MCV 98.7 07/12/2021 04:15 PM    MCH 33 07/12/2021 04:15 PM    MCHC 33.5 07/12/2021 04:15 PM    MPV 8.4 07/12/2021 04:15 PM    NEUTSPOLYS 59 07/12/2021 04:15 PM    LYMPHOCYTES 35 07/12/2021 04:15 PM    MONOCYTES 4 07/12/2021 04:15 PM    EOSINOPHILS 1 07/12/2021 04:15 PM    BASOPHILS 1 07/12/2021 04:15 PM        Lab Results   Component Value Date/Time    HBA1C 5.5 06/19/2024 10:10 AM        Imaging:   No spine imaging                                                  Diagnosis   Visit Diagnoses     ICD-10-CM   1. Chronic bilateral low back pain without sciatica  M54.50    G89.29   2. Right hip pain  M25.551   3. Mid back pain  M54.9   4. Neck pain  M54.2   5. History of fusion of cervical spine  Z98.1   6. Age-related osteoporosis without current pathological fracture  M81.0         ASSESSMENT AND PLAN:  Lidia Keen is a 69 y.o. female who presents to clinic for evaluation of chronic neck and back pain.     Lidia was seen today for new patient.    Diagnoses and all orders for this visit:    Chronic bilateral low back pain without sciatica  -     DX-LUMBAR SPINE-4+ VIEWS; Future  -     MR-LUMBAR SPINE-W/O; Future    Right hip pain  -     DX-HIP-COMPLETE - UNILATERAL 2+ RIGHT; Future    Mid back pain  -     DX-THORACIC SPINE-2 VIEWS; Future    Neck pain  -     DX-CERVICAL SPINE-2 OR 3 VIEWS; Future    History of fusion of cervical spine  -     DX-CERVICAL SPINE-2 OR 3 VIEWS; Future    Age-related osteoporosis without current pathological fracture  -      MR-LUMBAR SPINE-W/O; Future        Assessment & Plan  Chronic low back pain without radiation into the lower extremities  Right hip pain  Chronic mid back pain  Chronic neck pain  History of C4-7 fusion  She reports a long history of neck, mid back, and low back pain, with the most bothersome pain currently in the right greater than left low back and buttock. The pain is described as aching and ranges from 3-7 out of 10 in intensity, worsened by prolonged standing, walking, and bending, and somewhat relieved by sitting or lying down. Previous radiofrequency ablations (RFAs) in the cervical, thoracic, and lumbar regions have been successful, while epidurals were not. Current pain in the low back is most likely secondary to hip osteoarthritis versus lumbar facet arthropathy. Updated x-rays of the neck, midback, lower back, and right hip will be obtained. An MRI of the lower back will also be conducted given patient's history of osteoporosis. She is advised to maintain her DEXA scan appointment. A few simple exercises for the low back have been recommended to be performed a couple of times a week for strengthening of the lumbar spine as she is not currently able to go to physical therapy appointments. If there is any flare-up in pain, she should discontinue these exercises. She reports currently her neck and upper back pain are under good control but we discussed that in the future if this pain is flared we can consider repeat trigger point injections.      Home Exercise Program: I provided the patient with a strengthening and stretching with a home exercise program targeting the low back    Diagnostic Workup: As above x-ray's of the cervical, thoracic, and lumbar spine and right hip, MRI of the lumbar spine    Medications: Continue duloxetine. I would not recommend additional medication such as NSAIDs due to CKD or muscle relaxants, neuropathic pain medications, or opioid pain medications given patient's age and  medical comorbidities    Interventional Treatment: I would consider the patient for a right hip intraarticular steroid injection versus lumbar medial branch blocks pending the results of the above    Follow-up: After the above diagnostic studies in 3 weeks      Please note that this dictation was created using voice recognition software. I have made every reasonable attempt to correct obvious errors but there may be errors of grammar and content that I may have overlooked prior to finalization of this note.      Maritza Padron MD  Physical Medicine and Rehabilitation  Interventional Spine and Sports Physiatry  [facility]       ISAIAS Palafox Peter B, D.O..

## 2025-05-27 ENCOUNTER — OFFICE VISIT (OUTPATIENT)
Dept: PHYSICAL MEDICINE AND REHAB | Facility: MEDICAL CENTER | Age: 70
End: 2025-05-27
Payer: MEDICARE

## 2025-05-27 VITALS
HEIGHT: 66 IN | BODY MASS INDEX: 32.95 KG/M2 | TEMPERATURE: 97.5 F | SYSTOLIC BLOOD PRESSURE: 137 MMHG | WEIGHT: 205 LBS | HEART RATE: 95 BPM | OXYGEN SATURATION: 94 % | DIASTOLIC BLOOD PRESSURE: 78 MMHG | RESPIRATION RATE: 16 BRPM

## 2025-05-27 DIAGNOSIS — G89.29 CHRONIC BILATERAL LOW BACK PAIN WITHOUT SCIATICA: Primary | ICD-10-CM

## 2025-05-27 DIAGNOSIS — M81.0 AGE-RELATED OSTEOPOROSIS WITHOUT CURRENT PATHOLOGICAL FRACTURE: ICD-10-CM

## 2025-05-27 DIAGNOSIS — M54.50 CHRONIC BILATERAL LOW BACK PAIN WITHOUT SCIATICA: Primary | ICD-10-CM

## 2025-05-27 DIAGNOSIS — M25.551 RIGHT HIP PAIN: ICD-10-CM

## 2025-05-27 DIAGNOSIS — Z98.1 HISTORY OF FUSION OF CERVICAL SPINE: ICD-10-CM

## 2025-05-27 DIAGNOSIS — M54.2 NECK PAIN: ICD-10-CM

## 2025-05-27 DIAGNOSIS — M54.9 MID BACK PAIN: ICD-10-CM

## 2025-05-27 ASSESSMENT — PATIENT HEALTH QUESTIONNAIRE - PHQ9
5. POOR APPETITE OR OVEREATING: 3 - NEARLY EVERY DAY
SUM OF ALL RESPONSES TO PHQ QUESTIONS 1-9: 13
CLINICAL INTERPRETATION OF PHQ2 SCORE: 3

## 2025-06-04 DIAGNOSIS — G89.29 CHRONIC PAIN OF RIGHT KNEE: Primary | ICD-10-CM

## 2025-06-04 DIAGNOSIS — M25.561 CHRONIC PAIN OF RIGHT KNEE: Primary | ICD-10-CM

## 2025-06-04 DIAGNOSIS — M17.11 PRIMARY OSTEOARTHRITIS OF RIGHT KNEE: ICD-10-CM

## 2025-06-05 ENCOUNTER — HOSPITAL ENCOUNTER (OUTPATIENT)
Dept: RADIOLOGY | Facility: MEDICAL CENTER | Age: 70
End: 2025-06-05
Attending: STUDENT IN AN ORGANIZED HEALTH CARE EDUCATION/TRAINING PROGRAM
Payer: MEDICARE

## 2025-06-05 DIAGNOSIS — M54.50 CHRONIC BILATERAL LOW BACK PAIN WITHOUT SCIATICA: ICD-10-CM

## 2025-06-05 DIAGNOSIS — G89.29 CHRONIC BILATERAL LOW BACK PAIN WITHOUT SCIATICA: ICD-10-CM

## 2025-06-05 DIAGNOSIS — M81.0 AGE-RELATED OSTEOPOROSIS WITHOUT CURRENT PATHOLOGICAL FRACTURE: ICD-10-CM

## 2025-06-05 DIAGNOSIS — M54.9 MID BACK PAIN: ICD-10-CM

## 2025-06-05 DIAGNOSIS — M54.2 NECK PAIN: ICD-10-CM

## 2025-06-05 DIAGNOSIS — Z98.1 HISTORY OF FUSION OF CERVICAL SPINE: ICD-10-CM

## 2025-06-05 DIAGNOSIS — M25.551 RIGHT HIP PAIN: ICD-10-CM

## 2025-06-05 PROCEDURE — 72110 X-RAY EXAM L-2 SPINE 4/>VWS: CPT

## 2025-06-05 PROCEDURE — 72148 MRI LUMBAR SPINE W/O DYE: CPT

## 2025-06-05 PROCEDURE — 72040 X-RAY EXAM NECK SPINE 2-3 VW: CPT

## 2025-06-05 PROCEDURE — 73502 X-RAY EXAM HIP UNI 2-3 VIEWS: CPT | Mod: RT

## 2025-06-05 PROCEDURE — 72070 X-RAY EXAM THORAC SPINE 2VWS: CPT

## 2025-06-06 ENCOUNTER — APPOINTMENT (OUTPATIENT)
Dept: INTERNAL MEDICINE | Facility: OTHER | Age: 70
End: 2025-06-06
Payer: MEDICARE

## 2025-06-10 NOTE — Clinical Note
REFERRAL APPROVAL NOTICE         Sent on June 9, 2025                   Lidia Ann Osmani  [address]  [place] [address]                   Dear Ms. Keen,    After a careful review of the medical information and benefit coverage, [facility] has processed your referral. See below for additional details.    If applicable, you must be actively enrolled with your insurance for coverage of the authorized service. If you have any questions regarding your coverage, please contact your insurance directly.    REFERRAL INFORMATION   Referral #:  [id number]  Referred-To Provider    Referred-By Provider:  Orthoroberto Padron M.D.   [facility] [employer]      [address]  [address]  [place] [address]  [phone] [address] [facility]  [employer] [address]  [phone]    Referral Start Date:  06/04/2025  Referral End Date:   06/04/2026             SCHEDULING  If you do not already have an appointment, please call [phone] to make an appointment.     MORE INFORMATION  If you do not already have a "[employer]" account, sign up at: [email or web address]  You can access your medical information, make appointments, see lab results, billing information, and more.  If you have questions regarding this referral, please contact  the [facility] Referrals department at:             [phone]. Monday - Friday 8:00AM - 5:00PM.     Sincerely,    [facility]

## 2025-06-12 ENCOUNTER — APPOINTMENT (OUTPATIENT)
Dept: RADIOLOGY | Facility: MEDICAL CENTER | Age: 70
End: 2025-06-12
Payer: MEDICARE

## 2025-06-17 ENCOUNTER — APPOINTMENT (OUTPATIENT)
Dept: PHYSICAL MEDICINE AND REHAB | Facility: MEDICAL CENTER | Age: 70
End: 2025-06-17
Payer: MEDICARE

## 2025-07-14 ENCOUNTER — OFFICE VISIT (OUTPATIENT)
Dept: INTERNAL MEDICINE | Facility: OTHER | Age: 70
End: 2025-07-14
Payer: MEDICARE

## 2025-07-14 VITALS
HEIGHT: 66 IN | SYSTOLIC BLOOD PRESSURE: 125 MMHG | BODY MASS INDEX: 34.87 KG/M2 | OXYGEN SATURATION: 99 % | TEMPERATURE: 96.8 F | HEART RATE: 88 BPM | WEIGHT: 217 LBS | DIASTOLIC BLOOD PRESSURE: 78 MMHG

## 2025-07-14 DIAGNOSIS — E03.9 ACQUIRED HYPOTHYROIDISM: ICD-10-CM

## 2025-07-14 DIAGNOSIS — E06.3 HASHIMOTO'S THYROIDITIS: ICD-10-CM

## 2025-07-14 DIAGNOSIS — I10 BENIGN ESSENTIAL HYPERTENSION: Primary | ICD-10-CM

## 2025-07-14 DIAGNOSIS — E78.5 DYSLIPIDEMIA: ICD-10-CM

## 2025-07-14 PROCEDURE — G0439 PPPS, SUBSEQ VISIT: HCPCS | Mod: GE

## 2025-07-14 PROCEDURE — 3078F DIAST BP <80 MM HG: CPT

## 2025-07-14 PROCEDURE — 3074F SYST BP LT 130 MM HG: CPT

## 2025-07-14 RX ORDER — ROSUVASTATIN CALCIUM 20 MG/1
20 TABLET, COATED ORAL EVERY EVENING
Qty: 100 TABLET | Refills: 3 | Status: SHIPPED | OUTPATIENT
Start: 2025-07-14 | End: 2025-07-14

## 2025-07-14 RX ORDER — ROSUVASTATIN CALCIUM 20 MG/1
20 TABLET, COATED ORAL EVERY EVENING
Qty: 30 TABLET | Refills: 2 | Status: SHIPPED | OUTPATIENT
Start: 2025-07-14 | End: 2026-08-18

## 2025-07-14 RX ORDER — LEVOTHYROXINE SODIUM 150 UG/1
150 TABLET ORAL
Qty: 30 TABLET | Refills: 2 | Status: SHIPPED | OUTPATIENT
Start: 2025-07-14

## 2025-07-14 ASSESSMENT — ENCOUNTER SYMPTOMS: GENERAL WELL-BEING: FAIR

## 2025-07-14 ASSESSMENT — PATIENT HEALTH QUESTIONNAIRE - PHQ9
CLINICAL INTERPRETATION OF PHQ2 SCORE: 4
5. POOR APPETITE OR OVEREATING: 2 - MORE THAN HALF THE DAYS
SUM OF ALL RESPONSES TO PHQ QUESTIONS 1-9: 15

## 2025-07-14 ASSESSMENT — ACTIVITIES OF DAILY LIVING (ADL): BATHING_REQUIRES_ASSISTANCE: 1

## 2025-07-14 NOTE — PATIENT INSTRUCTIONS
Thank you for visiting [facility]!    Medications changes include stopping Simvastatin, replace with Rosuvastatin. Script sent to pharmacy.      Please do not hesitate to contact me on MyChart should you need anything else!

## 2025-07-14 NOTE — PROGRESS NOTES
Chief Complaint   Patient presents with    Annual Exam       HPI:  Lidia Keen is a 69 y.o. 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) 04/25/2023    Unsteady gait 12/28/2022    ERIC (obstructive sleep apnea) 12/28/2022    Stage 3a chronic kidney disease 11/12/2022    Asthma 08/24/2022    PVC's (premature ventricular contractions) 08/10/2022    Premature atrial contraction 07/07/2022    History of breast cancer 06/07/2022    Palpitations 06/07/2022    Chronic bronchitis (HCC) 11/18/2021    Generalized anxiety disorder 10/18/2021    Tremor 02/28/2021    Benign essential hypertension 08/19/2020    Late insomnia 08/19/2020    Restless legs 08/19/2020    Posttraumatic stress disorder 07/07/2020    Severe episode of recurrent major depressive disorder (HCC) 07/07/2020    Alcohol consumption binge drinking 07/07/2020    Age-related cataract 06/24/2020    Allergic rhinitis 06/24/2020    Anemia 06/24/2020    Gastroesophageal reflux disease 06/24/2020    Hashimoto's thyroiditis 06/24/2020    Chronic instability of knee 06/24/2020       Current Medications[1]       Current supplements as per medication list.     Allergies: Bacitracin, Doxycycline, Albuterol, Anastrozole, Aripiprazole, Brompheniramine, Buspirone, Cariprazine, Cephalexin, Clavulanic acid, Clonazepam, Cyclobenzaprine, Desonide, Dexlansoprazole, Diphenhydramine, Ephedrine, Escitalopram, Exemestane, Famotidine, Fexofenadine, Fluoxetine,  Gabapentin, Ipratropium, Lamotrigine, Lithium, Lurasidone, Moxifloxacin, Mupirocin, Olanzapine, Oxycodone, Pimecrolimus, Pregabalin, Propranolol, Quetiapine, Rabeprazole, Risperidone, Ropinirole, Tiotropium bromide monohydrate, Tizanidine, Tolterodine, Topiramate, Tramadol, Trazodone, Valproic acid, Venlafaxine, Vilazodone, Ciprofloxacin, Clindamycin, and Metronidazole    Current social contact/activities: Sees her son and his wife every 6-8 months. Reads often by herself.     She  reports that she has never smoked. She has never used smokeless tobacco. She reports current alcohol use of about 1.8 oz of alcohol per week. She reports that she does not currently use drugs after having used the following drugs: Marijuana.  Counseling given: Not Answered      ROS:    Gait: Uses a cane  Ostomy: No  Other tubes: No  Amputations: No  Chronic oxygen use: Yes  Last eye exam: May 2025  Wears hearing aids: No   : Reports urinary leakage during the last 6 months that has not interfered at all with their daily activities or sleep.    Depression Screening  Little interest or pleasure in doing things?  2 - more than half the days  Feeling down, depressed , or hopeless? 2 - more than half the days  Trouble falling or staying asleep, or sleeping too much?  1 - several days  Feeling tired or having little energy?  2 - more than half the days  Poor appetite or overeating?  2 - more than half the days  Feeling bad about yourself - or that you are a failure or have let yourself or your family down? 3 - nearly every day  Trouble concentrating on things, such as reading the newspaper or watching television? 2 - more than half the days  Moving or speaking so slowly that other people could have noticed.  Or the opposite - being so fidgety or restless that you have been moving around a lot more than usual?  1 - several days  Thoughts that you would be better off dead, or of hurting yourself?  0 - not at all  Patient Health Questionnaire  Score: 15    If depressive symptoms identified deferred to follow up visit unless specifically addressed in assessment and plan.    Interpretation of PHQ-9 Total Score   Score Severity   1-4 No Depression   5-9 Mild Depression   10-14 Moderate Depression   15-19 Moderately Severe Depression   20-27 Severe Depression    Screening for Cognitive Impairment  Do you or any of your friends or family members have any concern about your memory? No  Three Minute Recall (Village, Kitchen, Baby) 1/3    Kevin clock face with all 12 numbers and set the hands to show 10 minutes past 11.  Yes    Cognitive concerns identified deferred for follow up unless specifically addressed in assessment and plan.    Fall Risk Assessment  Has the patient had two or more falls in the last year or any fall with injury in the last year?  Yes    Safety Assessment  Do you always wear your seatbelt?  Yes  Any changes to home needed to function safely? No  Difficulty hearing.  Yes  Patient counseled about all safety risks that were identified.    Functional Assessment ADLs  Are there any barriers preventing you from cooking for yourself or meeting nutritional needs?  No.    Are there any barriers preventing you from driving safely or obtaining transportation?  No.    Are there any barriers preventing you from using a telephone or calling for help?  No    Are there any barriers preventing you from shopping?  No.    Are there any barriers preventing you from taking care of your own finances?  No    Are there any barriers preventing you from managing your medications?  No    Are there any barriers preventing you from showering, bathing or dressing yourself? Yes    Are there any barriers preventing you from doing housework or laundry? Yes    Are there any barriers preventing you from using the toilet?No    Are you currently engaging in any exercise or physical activity?  No.      Self-Assessment of Health  What is your perception of your health? Fair    Do  you sleep more than six hours a night? No    In the past 7 days, how much did pain keep you from doing your normal work? None    Do you spend quality time with family or friends (virtually or in person)? No    Do you usually eat a heart healthy diet that constists of a variety of fruits, vegetables, whole grains and fiber? Yes    Do you eat foods high in fat and/or Fast Food more than three times per week? No    How concerned are you that your medical conditions are not being well managed? a little    Are you worried that in the next 2 months, you may not have stable housing that you own, rent, or stay in as part of a household? No        Advance Care Planning  Do you have an Advance Directive, Living Will, Durable Power of , or POLST? No                 Health Maintenance Summary            Current Care Gaps       COVID-19 Vaccine (7 - 2024-25 season) Overdue since 4/6/2025      10/06/2024  Imm Admin: COVID-19, mRNA, LNP-S, PF, dmitriy-sucrose, 30 mcg/0.3 mL    03/26/2024  Imm Admin: COVID-19, mRNA, LNP-S, PF, dmitriy-sucrose, 30 mcg/0.3 mL    09/29/2022  Imm Admin: PFIZER BIVALENT SARS-COV-2 VACCINE (12+)    10/22/2021  Imm Admin: PFIZER PURPLE CAP SARS-COV-2 VACCINATION (12+)    04/22/2021  Imm Admin: PFIZER PURPLE CAP SARS-COV-2 VACCINATION (12+)     Only the first 5 history entries have been loaded, but more history exists.            Hepatitis C Screening (Once) Never done      No completion history exists for this topic.              Annual Pulmonary Function Test / Spirometry (Yearly) Never done     No completion history exists for this topic.              Colorectal Cancer Screening (View Topic Details) Never done      04/22/2025  Order placed for Cologuard® colon cancer screening by Fredy Ambrosio D.O.                      Awaiting Completion       Bone Density Scan (Every 5 Years) Scheduled for 7/31/2025 04/22/2025  Order placed for DS-BONE DENSITY STUDY (DEXA) by Fredy Ambrosio D.O.                       Upcoming       Influenza Vaccine (1) Next due on 9/1/2025      10/06/2024  Outside Immunization: Influenza, High Dose    01/24/2024  Outside Immunization: Fluzone High-Dose Quad    09/29/2022  Outside Immunization: Fluzone High-Dose Quad    10/22/2021  Outside Immunization: Fluzone High-Dose Quad    08/25/2020  Outside Immunization: Fluzone High-Dose Quad     Only the first 5 history entries have been loaded, but more history exists.            Annual Wellness Visit (Yearly) Next due on 7/14/2026 07/14/2025  Done              IMM DTaP/Tdap/Td Vaccine (2 - Td or Tdap) Next due on 5/18/2033 05/18/2023  Outside Immunization: Tdap                      Completed or No Longer Recommended       Zoster (Shingles) Vaccines (Series Information) Completed      04/14/2024  Outside Immunization: Zoster Glynn (Shingrix)    01/24/2024  Outside Immunization: Zoster Glynn (Shingrix)              Pneumococcal Vaccine: 50+ Years (Series Information) Completed      11/16/2020  Outside Immunization: PPSV23    12/03/2019  Outside Immunization: PPSV23    11/29/2018  Outside Immunization: PCV-13 (Prevnar 13)    11/18/2013  Outside Immunization: PCV-13 (Prevnar 13)              Hepatitis A Vaccine (Hep A) (Series Information) Aged Out      No completion history exists for this topic.              Hepatitis B Vaccine (Hep B) (Series Information) Aged Out     No completion history exists for this topic.              HPV Vaccines (Series Information) Aged Out     No completion history exists for this topic.              Polio Vaccine (Inactivated Polio) (Series Information) Aged Out     No completion history exists for this topic.              Meningococcal Immunization (Series Information) Aged Out     No completion history exists for this topic.              Meningococcal B Vaccine (Series Information) Aged Out     No completion history exists for this topic.                            Patient Care Team:  Fredy Ambrosio D.O. as  "PCP - General (Internal Medicine)      Social History[2]  Family History   Problem Relation Age of Onset    Alcohol abuse Mother     Prostate cancer Father     Pancreatic Cancer Father     Lupus Sister     Heart Attack Maternal Grandfather      She  has a past medical history of Anemia, Anxiety, Depression, Hyperlipidemia, Hypertension, and Thyroid disease.   Past Surgical History[3]    Exam:   /78 (BP Location: Right arm, Patient Position: Sitting, BP Cuff Size: Adult)   Pulse 88   Temp 36 °C (96.8 °F) (Temporal)   Ht 1.676 m (5' 6\")   Wt 98.4 kg (217 lb)   SpO2 99%  Body mass index is 35.02 kg/m².    Hearing fair.    Dentition fair, sees a dentist every 6 months  Alert, oriented in no acute distress.  Eye contact is good, speech goal directed, affect calm    Assessment and Plan. The following treatment and monitoring plan is recommended:      Medicare Annual Wellness    2. Benign essential hypertension  - Comp Metabolic Panel; Future    3. Hashimoto's thyroiditis  - levothyroxine (SYNTHROID) 150 MCG Tab; Take 1 Tablet by mouth every morning on an empty stomach.  Dispense: 30 Tablet; Refill: 2    4. Acquired hypothyroidism  - levothyroxine (SYNTHROID) 150 MCG Tab; Take 1 Tablet by mouth every morning on an empty stomach.  Dispense: 30 Tablet; Refill: 2    5. Dyslipidemia  - rosuvastatin (CRESTOR) 20 MG Tab; Take 1 Tablet by mouth every evening.  Dispense: 30 Tablet; Refill: 2  - stop Simvastatin given increase in LDL, ASCVD risk score was 13.7%    Services suggested: No services needed at this time  Health Care Screening: Age-appropriate preventive services recommended by USPTF and ACIP covered by Medicare were discussed today. Services ordered if indicated and agreed upon by the patient.  Referrals offered: Community-based lifestyle interventions to reduce health risks and promote self-management and wellness, fall prevention, nutrition, physical activity, tobacco-use cessation, weight loss, and mental " health services as per orders if indicated.    Discussion today about general wellness and lifestyle habits:    Prevent falls and reduce trip hazards; Cautioned about securing or removing rugs.  Have a working fire alarm and carbon monoxide detector;   Engage in regular physical activity and social activities     Follow-up: Return in about 3 months (around 10/14/2025).         [1]   Current Outpatient Medications   Medication Sig Dispense Refill    levothyroxine (SYNTHROID) 150 MCG Tab Take 1 Tablet by mouth every morning on an empty stomach. 30 Tablet 2    rosuvastatin (CRESTOR) 20 MG Tab Take 1 Tablet by mouth every evening. 30 Tablet 2    esomeprazole (NEXIUM) 40 MG delayed-release capsule TAKE 1 CAPSULE BY MOUTH ONCE DAILY IN THE MORNING BEFORE BREAKFAST 30 Capsule 2    losartan (COZAAR) 50 MG Tab Take 1 and 1/2 tablets daily 45 Tablet 2    buPROPion (WELLBUTRIN XL) 150 MG XL tablet Take 1 Tablet by mouth every morning.      clobetasol (TEMOVATE) 0.05 % Cream APPLY A THIN LAYER TO THE AFFECTED AREA(S) BY TOPICAL ROUTE 2 TIMES PER DAY      diazePAM (VALIUM) 5 MG Tab Take 1 Tablet by mouth 1 time a day as needed.      furosemide (LASIX) 20 MG Tab TAKE 1 TABLET BY MOUTH ONCE DAILY AS NEEDED FOR WEIGHT GAIN >2LBS      phenazopyridine (PYRIDIUM) 100 MG Tab TAKE 1 TABLET BY MOUTH 3 TIMES A DAY FOR 2 DAYS AS NEEDED FOR DYSURIA      valacyclovir (VALTREX) 1 GM Tab Take 1 tablet every 12 hours by oral route.      ketoconazole (NIZORAL) 2 % Cream APPLY TO THE AFFECTED AREA(S) BY TOPICAL ROUTE ONCE DAILY 30 g 1    DULoxetine (CYMBALTA) 60 MG Cap DR Particles delayed-release capsule Take 120 mg by mouth every day.       No current facility-administered medications for this visit.   [2]   Social History  Tobacco Use    Smoking status: Never    Smokeless tobacco: Never   Vaping Use    Vaping status: Never Used   Substance Use Topics    Alcohol use: Yes     Alcohol/week: 1.8 oz     Types: 3 Shots of liquor per week      Comment: once a week    Drug use: Not Currently     Types: Marijuana   [3]   Past Surgical History:  Procedure Laterality Date    ARTHROSCOPIC TIBIAL PLATEAU FRACTURE REPAIR Right     BUNIONECTOMY Right     CERVICAL DISK AND FUSION ANTERIOR      C4-7, d/t herniated disc    ENDOMETRIAL ABLATION N/A     MASTECTOMY BILATERAL SUBQ Bilateral

## 2025-07-15 NOTE — PROGRESS NOTES
Verbal consent was acquired by the patient to use [employer] ambient listening note generation during this visit Yes     FOLLOW-UP PATIENT VISIT    Interventional Spine and Pain  Physiatry (Physical Medicine and Rehabilitation)     Date of Service: 25   Chief Complaint: No chief complaint on file.     Patient Name: Lidia Keen   : 1955   MRN: [id number]       PRIOR HISTORY    Please see new patient note by Dr. Padron for more details.     Interval History  Patient identification: Lidia Keen,  1955, with history of CKD3, depression and anxiety, ILD, GERD, breast cancer, ERIC, osteoporosis, who presents today for follow-up of diffuse back pain.    PT, home exercises    History of Present Illness         Red Flags ROS: ***  Fever, Chills, Sweats: Denies  Involuntary Weight Loss: Denies  Bladder Incontinence: Denies  Bowel Incontinence: Denies  Saddle Anesthesia: Denies      PMHx:   Past Medical History[1]    PSHx:   Past Surgical History[2]    Family history   Family History   Problem Relation Age of Onset    Alcohol abuse Mother     Prostate cancer Father     Pancreatic Cancer Father     Lupus Sister     Heart Attack Maternal Grandfather        Medications:   Active Medications[3]     Medications Ordered Prior to Encounter[4]    Allergies:   Allergies[5]    Social Hx:   Social History     Socioeconomic History    Marital status:      Spouse name: Not on file    Number of children: Not on file    Years of education: Not on file    Highest education level: Not on file   Occupational History    Not on file   Tobacco Use    Smoking status: Never    Smokeless tobacco: Never   Vaping Use    Vaping status: Never Used   Substance and Sexual Activity    Alcohol use: Yes     Alcohol/week: 1.8 oz     Types: 3 Shots of liquor per week     Comment: once a week    Drug use: Not Currently     Types: Marijuana    Sexual activity: Not on file   Other Topics Concern    Not on file    Social History Narrative    Not on file     Social Drivers of Health     Financial Resource Strain: Not on file   Food Insecurity: Not on file   Transportation Needs: Not on file   Physical Activity: Not on file   Stress: Not on file   Social Connections: Not on file   Intimate Partner Violence: Not on file   Housing Stability: Not on file         EXAMINATION     Physical Exam:   Vitals: There were no vitals taken for this visit.    Physical Exam         Constitutional:   Body Habitus: There is no height or weight on file to calculate BMI.  Cooperation: Fully cooperates with exam  Appearance: Well-groomed, well-nourished  Respiratory:  Breathing comfortable on room air, no audible wheezing  Cardiovascular: Skin appears well-perfused  Psychiatric: Appropriate affect  Gait: normal gait, no use of ambulatory device, nonantalgic. {ckwalParker}. ***    Neurologic:  Strength:    Bilateral UE 5/5 in shoulder abductors, elbow extensors and flexors, wrist extensors, finger abductors, and finger flexors   Bilateral LE 5/5 in hip flexors, knee extensors and flexors, ankle dorsiflexors and plantarflexors, great toe extensors   Reflexes: 2+ in bilateral patella and achilles (brachioradialis, biceps, triceps)   Sensation: grossly intact bilaterally dermatomes L3-S1 (C5-T1)   MSK:?No?TTP across axial spinous processes and paraspinals bilaterally, has?preserved?active lumbar ROM with pain with ***     Special?tests:    Negative slump test bilaterally   Negative FADIR, log roll bilaterally   Negative thigh thrust, LOURDES bilaterally   Negative facet loading maneuvers bilaterally       MEDICAL DECISION MAKING    DATA    Labs:   Lab Results   Component Value Date/Time    SODIUM 132 (L) 2022 02:50 PM    POTASSIUM 3.6 2022 02:50 PM    CHLORIDE 103 2022 02:50 PM    CO2 21.0 2022 02:50 PM    GLUCOSE 116 2022 02:50 PM    BUN 12.0 2022 02:50 PM    CREATININE 1.1 (H) 2022 02:50 PM    BUNCREATRAT  10.9 07/20/2022 02:50 PM        Lab Results   Component Value Date/Time    RBC 4.18 07/12/2021 04:15 PM    HEMOGLOBIN 13.8 07/12/2021 04:15 PM    HEMATOCRIT 41.3 07/12/2021 04:15 PM    MCV 98.7 07/12/2021 04:15 PM    MCH 33 07/12/2021 04:15 PM    MCHC 33.5 07/12/2021 04:15 PM    MPV 8.4 07/12/2021 04:15 PM    NEUTSPOLYS 59 07/12/2021 04:15 PM    LYMPHOCYTES 35 07/12/2021 04:15 PM    MONOCYTES 4 07/12/2021 04:15 PM    EOSINOPHILS 1 07/12/2021 04:15 PM    BASOPHILS 1 07/12/2021 04:15 PM        Lab Results   Component Value Date/Time    HBA1C 5.5 06/19/2024 10:10 AM          Imaging:   I personally reviewed the following images, below are my independent reads:  X-ray lumbar spine 6/5/25: Mild scoliosis. Grade 1/2 L4-5 anterolisthesis. Lower lumbar facet arthropathy      X-ray right hip 6/5/25: Mild to moderate right hip joint space narrowing with bone spurs consistent with osteoarthritis      I reviewed the following radiology reports  X-ray lumbar spine 6/5/25:  IMPRESSION:  Moderate spondylosis with grade 2 L4/5 anterolisthesis secondary to facet arthropathy   Mild degenerative disc disease greatest at L2/3  No abnormal motion with flexion or extension.      X-ray right hip 6/5/25:  FINDINGS:  No acute fracture or subluxation is seen.  Moderate right hip marginal spurring is seen. There is mild bilateral hip joint space narrowing and minimal left spurring.  Pelvic phleboliths  IMPRESSION:  Moderate right, mild left hip osteoarthritis.      X-ray thoracic spine 6/5/25:  IMPRESSION:  Mild-moderate thoracic degenerative disc disease.      X-ray cervical spine 6/5/25:  IMPRESSION:  C5-C7 ACDF with no evidence of hardware loosening or fracture  Moderate cervical spondylosis.      MRI Lumbar spine 6/5/25:  IMPRESSION:  Grade 1 anterolisthesis of L4 over L5.  Moderate bilateral foraminal narrowing at L2-3.      DIAGNOSIS   {No diagnosis found. (Refresh or delete this SmartLink)}      ASSESSMENT and PLAN:     Lidia Escobedo  Osmani is a 69 y.o. female who returns to clinic for follow-up of ***.    There are no diagnoses linked to this encounter.    Assessment & Plan        -***    Follow up: {cktime follow up:96552}    Thank you for allowing me to participate in the care of this patient. If you have any questions please not hesitate to contact me.         Please note that this dictation was created using voice recognition software. I have made every reasonable attempt to correct obvious errors but there may be errors of grammar and content that I may have overlooked prior to finalization of this note.    Maritza Padron MD  Interventional Spine and Sports Physiatry  Physical Medicine and Rehabilitation  [facility]         [1]   Past Medical History:  Diagnosis Date    Anemia     Anxiety     Depression     Hyperlipidemia     Hypertension     Thyroid disease    [2]   Past Surgical History:  Procedure Laterality Date    ARTHROSCOPIC TIBIAL PLATEAU FRACTURE REPAIR Right     BUNIONECTOMY Right     CERVICAL DISK AND FUSION ANTERIOR      C4-7, d/t herniated disc    ENDOMETRIAL ABLATION N/A     MASTECTOMY BILATERAL SUBQ Bilateral    [3]   No outpatient medications have been marked as taking for the 7/17/25 encounter (Appointment) with Maritza Padron M.D..   [4]   Current Outpatient Medications on File Prior to Visit   Medication Sig Dispense Refill    levothyroxine (SYNTHROID) 150 MCG Tab Take 1 Tablet by mouth every morning on an empty stomach. 30 Tablet 2    rosuvastatin (CRESTOR) 20 MG Tab Take 1 Tablet by mouth every evening. 30 Tablet 2    esomeprazole (NEXIUM) 40 MG delayed-release capsule TAKE 1 CAPSULE BY MOUTH ONCE DAILY IN THE MORNING BEFORE BREAKFAST 30 Capsule 2    losartan (COZAAR) 50 MG Tab Take 1 and 1/2 tablets daily 45 Tablet 2    buPROPion (WELLBUTRIN XL) 150 MG XL tablet Take 1 Tablet by mouth every morning.      clobetasol (TEMOVATE) 0.05 % Cream APPLY A THIN LAYER TO THE AFFECTED AREA(S) BY TOPICAL ROUTE 2 TIMES  PER DAY      diazePAM (VALIUM) 5 MG Tab Take 1 Tablet by mouth 1 time a day as needed.      furosemide (LASIX) 20 MG Tab TAKE 1 TABLET BY MOUTH ONCE DAILY AS NEEDED FOR WEIGHT GAIN >2LBS      phenazopyridine (PYRIDIUM) 100 MG Tab TAKE 1 TABLET BY MOUTH 3 TIMES A DAY FOR 2 DAYS AS NEEDED FOR DYSURIA      valacyclovir (VALTREX) 1 GM Tab Take 1 tablet every 12 hours by oral route.      ketoconazole (NIZORAL) 2 % Cream APPLY TO THE AFFECTED AREA(S) BY TOPICAL ROUTE ONCE DAILY 30 g 1    DULoxetine (CYMBALTA) 60 MG Cap DR Particles delayed-release capsule Take 120 mg by mouth every day.       No current facility-administered medications on file prior to visit.   [5]   Allergies  Allergen Reactions    Bacitracin Swelling    Doxycycline Swelling    Albuterol     Anastrozole     Aripiprazole     Brompheniramine     Buspirone     Cariprazine     Cephalexin Diarrhea and Unspecified    Clavulanic Acid     Clonazepam     Cyclobenzaprine     Desonide     Dexlansoprazole     Diphenhydramine     Ephedrine     Escitalopram     Exemestane     Famotidine     Fexofenadine     Fluoxetine     Gabapentin     Ipratropium     Lamotrigine     Lithium     Lurasidone     Moxifloxacin     Mupirocin     Olanzapine     Oxycodone     Pimecrolimus     Pregabalin     Propranolol     Quetiapine     Rabeprazole     Risperidone     Ropinirole     Tiotropium Bromide Monohydrate     Tizanidine Unspecified     Severe sweating and dizziness and weakness.    Tolterodine     Topiramate     Tramadol     Trazodone     Valproic Acid     Venlafaxine     Vilazodone Unspecified    Ciprofloxacin Diarrhea    Clindamycin Diarrhea    Metronidazole Unspecified

## 2025-07-17 ENCOUNTER — APPOINTMENT (OUTPATIENT)
Dept: PHYSICAL MEDICINE AND REHAB | Facility: MEDICAL CENTER | Age: 70
End: 2025-07-17
Payer: MEDICARE

## 2025-07-23 ENCOUNTER — OFFICE VISIT (OUTPATIENT)
Dept: INTERNAL MEDICINE | Facility: OTHER | Age: 70
End: 2025-07-23
Payer: MEDICARE

## 2025-07-23 VITALS
DIASTOLIC BLOOD PRESSURE: 72 MMHG | BODY MASS INDEX: 34.3 KG/M2 | HEART RATE: 104 BPM | TEMPERATURE: 97.1 F | OXYGEN SATURATION: 97 % | HEIGHT: 66 IN | WEIGHT: 213.4 LBS | SYSTOLIC BLOOD PRESSURE: 110 MMHG

## 2025-07-23 DIAGNOSIS — E78.5 DYSLIPIDEMIA: Primary | ICD-10-CM

## 2025-07-23 PROCEDURE — 3078F DIAST BP <80 MM HG: CPT

## 2025-07-23 PROCEDURE — 99213 OFFICE O/P EST LOW 20 MIN: CPT | Mod: GE

## 2025-07-23 PROCEDURE — 3074F SYST BP LT 130 MM HG: CPT

## 2025-07-23 ASSESSMENT — ENCOUNTER SYMPTOMS
NERVOUS/ANXIOUS: 0
EYE PAIN: 0
HEARTBURN: 0
VOMITING: 0
CONSTIPATION: 0
DIARRHEA: 0
BLURRED VISION: 0
SHORTNESS OF BREATH: 0
HEADACHES: 0
NAUSEA: 0
PALPITATIONS: 0
PHOTOPHOBIA: 0
SORE THROAT: 0
WEAKNESS: 1
FEVER: 0
DEPRESSION: 0
ABDOMINAL PAIN: 0
WHEEZING: 0
DIAPHORESIS: 0
DIZZINESS: 0
COUGH: 0
CHILLS: 0
INSOMNIA: 0

## 2025-07-23 NOTE — PROGRESS NOTES
"    OFFICE VISIT    Lidia Keen is a 69 y.o. female who presents today with the following:    Reason for visit: Possible medication adverse reaction    HPI:    Patient reports excessive fatigue, weakness while on Crestor. Denies myalgias. She also felt confused yesterday but notes it is better today. Denies dizziness, lightheadedness, nausea, vomiting. Also endorsed lack of appetite.     Tomorrow she will have her PFT, stress test and then she will see pulm this upcoming Friday.     Review of Systems   Constitutional:  Positive for malaise/fatigue. Negative for chills, diaphoresis and fever.   HENT:  Negative for congestion, ear pain, hearing loss and sore throat.    Eyes:  Negative for blurred vision, photophobia and pain.   Respiratory:  Negative for cough, shortness of breath and wheezing.    Cardiovascular:  Negative for chest pain, palpitations and leg swelling.   Gastrointestinal:  Negative for abdominal pain, constipation, diarrhea, heartburn, nausea and vomiting.   Genitourinary:  Negative for dysuria and frequency.   Skin:  Negative for rash.   Neurological:  Positive for weakness. Negative for dizziness and headaches.   Psychiatric/Behavioral:  Negative for depression. The patient is not nervous/anxious and does not have insomnia.        Vitals:   /72 (BP Location: Right arm, Patient Position: Sitting, BP Cuff Size: Large adult)   Pulse (!) 104   Temp 36.2 °C (97.1 °F) (Temporal)   Ht 1.676 m (5' 6\")   Wt 96.8 kg (213 lb 6.4 oz)   SpO2 97%   BMI 34.44 kg/m²     Physical Exam  Vitals and nursing note reviewed.   Constitutional:       General: She is not in acute distress.     Appearance: She is not diaphoretic.   HENT:      Head: Normocephalic and atraumatic.      Mouth/Throat:      Mouth: Mucous membranes are moist.      Pharynx: Oropharynx is clear. No oropharyngeal exudate or posterior oropharyngeal erythema.   Cardiovascular:      Rate and Rhythm: Normal rate and regular rhythm.     "  Pulses: Normal pulses.      Heart sounds: Normal heart sounds. No murmur heard.     No gallop.   Pulmonary:      Effort: Pulmonary effort is normal. No respiratory distress.      Breath sounds: Normal breath sounds. No wheezing, rhonchi or rales.   Abdominal:      General: There is no distension.      Palpations: Abdomen is soft.      Tenderness: There is no abdominal tenderness.   Musculoskeletal:      Right lower leg: No edema.      Left lower leg: No edema.   Skin:     General: Skin is warm.      Findings: No rash.   Neurological:      General: No focal deficit present.      Mental Status: She is alert and oriented to person, place, and time.   Psychiatric:         Mood and Affect: Mood normal.         Assessment and Plan:     Dyslipidemia  Presented to discuss side effects of Crestor as she has new symptoms of worsening fatigue and weakness. I do not suspect this is due to Crestor. Denies jaundice, myalgias. Reviewed red flag symptoms to monitor.   -Will recheck LFTs prior to her next appointment  -Continue Crestor 20 mg QHS      No orders of the defined types were placed in this encounter.      No follow-ups on file.      Patient case was seen/ assessed/ discussed with Dr. Waldrop      Please note that this dictation was created using voice recognition software. I have made every reasonable attempt to correct obvious errors, but I expect that there are errors of grammar and possibly content that I did not discover before finalizing the note.       Signed by:    Fredy Ambrosio DO    PGY-2 Internal Medicine Resident

## 2025-07-24 NOTE — ASSESSMENT & PLAN NOTE
Presented to discuss side effects of Crestor as she has new symptoms of worsening fatigue and weakness. I do not suspect this is due to Crestor. Denies jaundice, myalgias. Reviewed red flag symptoms to monitor.   -Will recheck LFTs prior to her next appointment  -Continue Crestor 20 mg QHS

## 2025-07-31 ENCOUNTER — APPOINTMENT (OUTPATIENT)
Dept: RADIOLOGY | Facility: MEDICAL CENTER | Age: 70
End: 2025-07-31
Payer: MEDICARE

## 2025-08-01 ENCOUNTER — PHYSICAL THERAPY (OUTPATIENT)
Dept: PHYSICAL THERAPY | Facility: MEDICAL CENTER | Age: 70
End: 2025-08-01
Payer: MEDICARE

## 2025-08-01 DIAGNOSIS — I89.0 LYMPHEDEMA: Primary | ICD-10-CM

## 2025-08-01 DIAGNOSIS — Z85.3 HISTORY OF LEFT BREAST CANCER: ICD-10-CM

## 2025-08-01 PROCEDURE — 97535 SELF CARE MNGMENT TRAINING: CPT

## 2025-08-01 PROCEDURE — 97161 PT EVAL LOW COMPLEX 20 MIN: CPT

## 2025-08-01 SDOH — ECONOMIC STABILITY: GENERAL: QUALITY OF LIFE: GOOD

## 2025-08-01 ASSESSMENT — ENCOUNTER SYMPTOMS
PAIN SCALE: 2
PAIN SCALE AT HIGHEST: 4
PAIN SCALE AT LOWEST: 0

## 2025-08-08 ENCOUNTER — PHYSICAL THERAPY (OUTPATIENT)
Dept: PHYSICAL THERAPY | Facility: MEDICAL CENTER | Age: 70
End: 2025-08-08
Payer: MEDICARE

## 2025-08-08 ENCOUNTER — OFFICE VISIT (OUTPATIENT)
Dept: PHYSICAL MEDICINE AND REHAB | Facility: MEDICAL CENTER | Age: 70
End: 2025-08-08
Payer: MEDICARE

## 2025-08-08 VITALS
OXYGEN SATURATION: 96 % | DIASTOLIC BLOOD PRESSURE: 78 MMHG | SYSTOLIC BLOOD PRESSURE: 136 MMHG | TEMPERATURE: 96.5 F | HEART RATE: 83 BPM | WEIGHT: 213 LBS | HEIGHT: 66 IN | BODY MASS INDEX: 34.23 KG/M2

## 2025-08-08 DIAGNOSIS — M54.50 CHRONIC BILATERAL LOW BACK PAIN WITHOUT SCIATICA: ICD-10-CM

## 2025-08-08 DIAGNOSIS — M85.80 OSTEOPENIA, UNSPECIFIED LOCATION: ICD-10-CM

## 2025-08-08 DIAGNOSIS — M54.2 NECK PAIN: ICD-10-CM

## 2025-08-08 DIAGNOSIS — G89.29 CHRONIC BILATERAL LOW BACK PAIN WITHOUT SCIATICA: ICD-10-CM

## 2025-08-08 DIAGNOSIS — Z98.1 HISTORY OF FUSION OF CERVICAL SPINE: ICD-10-CM

## 2025-08-08 DIAGNOSIS — I89.0 LYMPHEDEMA: Primary | ICD-10-CM

## 2025-08-08 DIAGNOSIS — M54.9 MID BACK PAIN: ICD-10-CM

## 2025-08-08 DIAGNOSIS — Z85.3 HISTORY OF LEFT BREAST CANCER: ICD-10-CM

## 2025-08-08 DIAGNOSIS — M79.672 LEFT FOOT PAIN: Primary | ICD-10-CM

## 2025-08-08 PROCEDURE — 97110 THERAPEUTIC EXERCISES: CPT

## 2025-08-08 PROCEDURE — 99214 OFFICE O/P EST MOD 30 MIN: CPT | Performed by: STUDENT IN AN ORGANIZED HEALTH CARE EDUCATION/TRAINING PROGRAM

## 2025-08-08 PROCEDURE — 3078F DIAST BP <80 MM HG: CPT | Performed by: STUDENT IN AN ORGANIZED HEALTH CARE EDUCATION/TRAINING PROGRAM

## 2025-08-08 PROCEDURE — 97140 MANUAL THERAPY 1/> REGIONS: CPT

## 2025-08-08 PROCEDURE — 3075F SYST BP GE 130 - 139MM HG: CPT | Performed by: STUDENT IN AN ORGANIZED HEALTH CARE EDUCATION/TRAINING PROGRAM

## 2025-08-08 PROCEDURE — 1125F AMNT PAIN NOTED PAIN PRSNT: CPT | Performed by: STUDENT IN AN ORGANIZED HEALTH CARE EDUCATION/TRAINING PROGRAM

## 2025-08-08 SDOH — ECONOMIC STABILITY: GENERAL: QUALITY OF LIFE: GOOD

## 2025-08-08 ASSESSMENT — PATIENT HEALTH QUESTIONNAIRE - PHQ9
CLINICAL INTERPRETATION OF PHQ2 SCORE: 2
5. POOR APPETITE OR OVEREATING: 2 - MORE THAN HALF THE DAYS
SUM OF ALL RESPONSES TO PHQ QUESTIONS 1-9: 9

## 2025-08-08 ASSESSMENT — ENCOUNTER SYMPTOMS
PAIN SCALE AT LOWEST: 0
PAIN SCALE AT HIGHEST: 4
PAIN SCALE: 2

## 2025-08-08 ASSESSMENT — PAIN SCALES - GENERAL: PAINLEVEL_OUTOF10: 1=MINIMAL PAIN

## 2025-08-20 ENCOUNTER — TELEPHONE (OUTPATIENT)
Dept: INTERNAL MEDICINE | Facility: OTHER | Age: 70
End: 2025-08-20

## 2025-08-20 ENCOUNTER — OFFICE VISIT (OUTPATIENT)
Dept: INTERNAL MEDICINE | Facility: OTHER | Age: 70
End: 2025-08-20
Payer: MEDICARE

## 2025-08-20 VITALS
HEIGHT: 66 IN | SYSTOLIC BLOOD PRESSURE: 122 MMHG | TEMPERATURE: 96.7 F | WEIGHT: 208.6 LBS | BODY MASS INDEX: 33.52 KG/M2 | OXYGEN SATURATION: 100 % | DIASTOLIC BLOOD PRESSURE: 74 MMHG | HEART RATE: 84 BPM

## 2025-08-20 DIAGNOSIS — R06.00 DYSPNEA, UNSPECIFIED TYPE: ICD-10-CM

## 2025-08-20 DIAGNOSIS — J30.2 SEASONAL ALLERGIES: ICD-10-CM

## 2025-08-20 DIAGNOSIS — J84.9 INTERSTITIAL LUNG DISEASE (HCC): Primary | ICD-10-CM

## 2025-08-20 DIAGNOSIS — J96.11 CHRONIC HYPOXIC RESPIRATORY FAILURE (HCC): ICD-10-CM

## 2025-08-20 PROCEDURE — 99214 OFFICE O/P EST MOD 30 MIN: CPT | Mod: GC

## 2025-08-20 PROCEDURE — 3078F DIAST BP <80 MM HG: CPT | Mod: GC

## 2025-08-20 PROCEDURE — 3074F SYST BP LT 130 MM HG: CPT | Mod: GC

## 2025-08-20 RX ORDER — PREDNISONE 20 MG/1
40 TABLET ORAL DAILY
Qty: 10 TABLET | Refills: 0 | Status: SHIPPED | OUTPATIENT
Start: 2025-08-20 | End: 2025-08-25

## 2025-08-20 ASSESSMENT — ENCOUNTER SYMPTOMS
HEADACHES: 0
WEIGHT LOSS: 0
MYALGIAS: 0
MUSCULOSKELETAL NEGATIVE: 1
DIZZINESS: 0
SPUTUM PRODUCTION: 0
FEVER: 0
DEPRESSION: 0
CARDIOVASCULAR NEGATIVE: 1
HEARTBURN: 0
CHILLS: 0
HALLUCINATIONS: 0
GASTROINTESTINAL NEGATIVE: 1
PSYCHIATRIC NEGATIVE: 1
ORTHOPNEA: 0
ABDOMINAL PAIN: 0
WHEEZING: 1
PALPITATIONS: 0
TINGLING: 0
COUGH: 0
HEMOPTYSIS: 0
SHORTNESS OF BREATH: 1
EYES NEGATIVE: 1

## 2025-08-21 ENCOUNTER — APPOINTMENT (OUTPATIENT)
Dept: RADIOLOGY | Facility: MEDICAL CENTER | Age: 70
End: 2025-08-21
Payer: MEDICARE

## 2025-08-21 ENCOUNTER — PHYSICAL THERAPY (OUTPATIENT)
Dept: PHYSICAL THERAPY | Facility: MEDICAL CENTER | Age: 70
End: 2025-08-21
Payer: MEDICARE

## 2025-08-21 DIAGNOSIS — I89.0 LYMPHEDEMA: Primary | ICD-10-CM

## 2025-08-21 DIAGNOSIS — Z85.3 HISTORY OF LEFT BREAST CANCER: ICD-10-CM

## 2025-08-21 DIAGNOSIS — J84.9 INTERSTITIAL LUNG DISEASE (HCC): ICD-10-CM

## 2025-08-21 PROCEDURE — 97140 MANUAL THERAPY 1/> REGIONS: CPT

## 2025-08-21 PROCEDURE — 97110 THERAPEUTIC EXERCISES: CPT

## 2025-08-21 PROCEDURE — 71046 X-RAY EXAM CHEST 2 VIEWS: CPT

## 2025-08-21 SDOH — ECONOMIC STABILITY: GENERAL: QUALITY OF LIFE: GOOD

## 2025-08-21 ASSESSMENT — ENCOUNTER SYMPTOMS
PAIN SCALE AT LOWEST: 0
PAIN SCALE AT HIGHEST: 4
PAIN SCALE: 2

## 2025-08-22 ENCOUNTER — OFFICE VISIT (OUTPATIENT)
Dept: INTERNAL MEDICINE | Facility: OTHER | Age: 70
End: 2025-08-22
Payer: MEDICARE

## 2025-08-22 VITALS
SYSTOLIC BLOOD PRESSURE: 154 MMHG | DIASTOLIC BLOOD PRESSURE: 78 MMHG | HEART RATE: 78 BPM | OXYGEN SATURATION: 99 % | WEIGHT: 211 LBS | BODY MASS INDEX: 33.91 KG/M2 | TEMPERATURE: 97.6 F | HEIGHT: 66 IN

## 2025-08-22 DIAGNOSIS — J84.9 INTERSTITIAL LUNG DISEASE (HCC): ICD-10-CM

## 2025-08-22 DIAGNOSIS — E78.2 MIXED HYPERLIPIDEMIA: ICD-10-CM

## 2025-08-22 DIAGNOSIS — R06.00 DYSPNEA, UNSPECIFIED TYPE: Primary | ICD-10-CM

## 2025-08-22 DIAGNOSIS — I10 BENIGN ESSENTIAL HYPERTENSION: ICD-10-CM

## 2025-08-22 DIAGNOSIS — J30.2 SEASONAL ALLERGIES: ICD-10-CM

## 2025-08-22 DIAGNOSIS — Z76.89 ENCOUNTER TO ESTABLISH CARE: ICD-10-CM

## 2025-08-22 DIAGNOSIS — J96.11 CHRONIC HYPOXIC RESPIRATORY FAILURE (HCC): ICD-10-CM

## 2025-08-22 RX ORDER — PREDNISONE 10 MG/1
10 TABLET ORAL DAILY
Qty: 5 TABLET | Refills: 0 | Status: SHIPPED | OUTPATIENT
Start: 2025-08-22 | End: 2025-08-24

## 2025-08-22 ASSESSMENT — PAIN SCALES - GENERAL: PAINLEVEL_OUTOF10: NO PAIN

## 2025-08-24 RX ORDER — PREDNISONE 20 MG/1
10 TABLET ORAL DAILY
Qty: 5 TABLET | Refills: 0 | Status: SHIPPED | OUTPATIENT
Start: 2025-08-24

## 2025-08-24 ASSESSMENT — ENCOUNTER SYMPTOMS
MYALGIAS: 0
GASTROINTESTINAL NEGATIVE: 1
MUSCULOSKELETAL NEGATIVE: 1
SHORTNESS OF BREATH: 1
CARDIOVASCULAR NEGATIVE: 1
COUGH: 0
FEVER: 0
HEARTBURN: 0
PSYCHIATRIC NEGATIVE: 1
EYES NEGATIVE: 1
CONSTITUTIONAL NEGATIVE: 1
HEADACHES: 0
DEPRESSION: 0
TINGLING: 0
CHILLS: 0
DIZZINESS: 0
WEIGHT LOSS: 0
PALPITATIONS: 0
NEUROLOGICAL NEGATIVE: 1
HEMOPTYSIS: 0
HALLUCINATIONS: 0
ORTHOPNEA: 0
ABDOMINAL PAIN: 0
SPUTUM PRODUCTION: 0